# Patient Record
Sex: FEMALE | Race: ASIAN | NOT HISPANIC OR LATINO | URBAN - METROPOLITAN AREA
[De-identification: names, ages, dates, MRNs, and addresses within clinical notes are randomized per-mention and may not be internally consistent; named-entity substitution may affect disease eponyms.]

---

## 2017-01-18 ENCOUNTER — EMERGENCY (EMERGENCY)
Facility: HOSPITAL | Age: 47
LOS: 1 days | Discharge: HOME | End: 2017-01-18
Attending: EMERGENCY MEDICINE | Admitting: EMERGENCY MEDICINE
Payer: MEDICAID

## 2017-01-18 VITALS
TEMPERATURE: 98 F | HEART RATE: 91 BPM | DIASTOLIC BLOOD PRESSURE: 102 MMHG | RESPIRATION RATE: 20 BRPM | OXYGEN SATURATION: 100 % | SYSTOLIC BLOOD PRESSURE: 148 MMHG

## 2017-01-18 VITALS
SYSTOLIC BLOOD PRESSURE: 140 MMHG | DIASTOLIC BLOOD PRESSURE: 81 MMHG | TEMPERATURE: 98 F | RESPIRATION RATE: 20 BRPM | HEART RATE: 95 BPM | OXYGEN SATURATION: 100 %

## 2017-01-18 DIAGNOSIS — Z90.710 ACQUIRED ABSENCE OF BOTH CERVIX AND UTERUS: Chronic | ICD-10-CM

## 2017-01-18 LAB
ALBUMIN SERPL ELPH-MCNC: 4.3 G/DL — SIGNIFICANT CHANGE UP (ref 3.3–5)
ALP SERPL-CCNC: 125 U/L — HIGH (ref 40–120)
ALT FLD-CCNC: 26 U/L — SIGNIFICANT CHANGE UP (ref 4–33)
AST SERPL-CCNC: 24 U/L — SIGNIFICANT CHANGE UP (ref 4–32)
BASOPHILS # BLD AUTO: 0.08 K/UL — SIGNIFICANT CHANGE UP (ref 0–0.2)
BASOPHILS NFR BLD AUTO: 0.8 % — SIGNIFICANT CHANGE UP (ref 0–2)
BILIRUB SERPL-MCNC: 0.3 MG/DL — SIGNIFICANT CHANGE UP (ref 0.2–1.2)
BUN SERPL-MCNC: 7 MG/DL — SIGNIFICANT CHANGE UP (ref 7–23)
CALCIUM SERPL-MCNC: 9.5 MG/DL — SIGNIFICANT CHANGE UP (ref 8.4–10.5)
CHLORIDE SERPL-SCNC: 103 MMOL/L — SIGNIFICANT CHANGE UP (ref 98–107)
CO2 SERPL-SCNC: 24 MMOL/L — SIGNIFICANT CHANGE UP (ref 22–31)
CREAT SERPL-MCNC: 0.63 MG/DL — SIGNIFICANT CHANGE UP (ref 0.5–1.3)
EOSINOPHIL # BLD AUTO: 0.27 K/UL — SIGNIFICANT CHANGE UP (ref 0–0.5)
EOSINOPHIL NFR BLD AUTO: 2.7 % — SIGNIFICANT CHANGE UP (ref 0–6)
GLUCOSE SERPL-MCNC: 117 MG/DL — HIGH (ref 70–99)
HCT VFR BLD CALC: 41.2 % — SIGNIFICANT CHANGE UP (ref 34.5–45)
HGB BLD-MCNC: 13.9 G/DL — SIGNIFICANT CHANGE UP (ref 11.5–15.5)
IMM GRANULOCYTES NFR BLD AUTO: 0.2 % — SIGNIFICANT CHANGE UP (ref 0–1.5)
LYMPHOCYTES # BLD AUTO: 2.29 K/UL — SIGNIFICANT CHANGE UP (ref 1–3.3)
LYMPHOCYTES # BLD AUTO: 23.3 % — SIGNIFICANT CHANGE UP (ref 13–44)
MCHC RBC-ENTMCNC: 29.7 PG — SIGNIFICANT CHANGE UP (ref 27–34)
MCHC RBC-ENTMCNC: 33.7 % — SIGNIFICANT CHANGE UP (ref 32–36)
MCV RBC AUTO: 88 FL — SIGNIFICANT CHANGE UP (ref 80–100)
MONOCYTES # BLD AUTO: 0.54 K/UL — SIGNIFICANT CHANGE UP (ref 0–0.9)
MONOCYTES NFR BLD AUTO: 5.5 % — SIGNIFICANT CHANGE UP (ref 2–14)
NEUTROPHILS # BLD AUTO: 6.62 K/UL — SIGNIFICANT CHANGE UP (ref 1.8–7.4)
NEUTROPHILS NFR BLD AUTO: 67.5 % — SIGNIFICANT CHANGE UP (ref 43–77)
PLATELET # BLD AUTO: 280 K/UL — SIGNIFICANT CHANGE UP (ref 150–400)
PMV BLD: 11.4 FL — SIGNIFICANT CHANGE UP (ref 7–13)
POTASSIUM SERPL-MCNC: 4.2 MMOL/L — SIGNIFICANT CHANGE UP (ref 3.5–5.3)
POTASSIUM SERPL-SCNC: 4.2 MMOL/L — SIGNIFICANT CHANGE UP (ref 3.5–5.3)
PROT SERPL-MCNC: 7.7 G/DL — SIGNIFICANT CHANGE UP (ref 6–8.3)
RBC # BLD: 4.68 M/UL — SIGNIFICANT CHANGE UP (ref 3.8–5.2)
RBC # FLD: 13 % — SIGNIFICANT CHANGE UP (ref 10.3–14.5)
SODIUM SERPL-SCNC: 143 MMOL/L — SIGNIFICANT CHANGE UP (ref 135–145)
WBC # BLD: 9.82 K/UL — SIGNIFICANT CHANGE UP (ref 3.8–10.5)
WBC # FLD AUTO: 9.82 K/UL — SIGNIFICANT CHANGE UP (ref 3.8–10.5)

## 2017-01-18 PROCEDURE — 99284 EMERGENCY DEPT VISIT MOD MDM: CPT

## 2017-01-18 RX ORDER — SODIUM CHLORIDE 9 MG/ML
1000 INJECTION INTRAMUSCULAR; INTRAVENOUS; SUBCUTANEOUS ONCE
Qty: 0 | Refills: 0 | Status: COMPLETED | OUTPATIENT
Start: 2017-01-18 | End: 2017-01-18

## 2017-01-18 RX ORDER — METOCLOPRAMIDE HCL 10 MG
10 TABLET ORAL ONCE
Qty: 0 | Refills: 0 | Status: COMPLETED | OUTPATIENT
Start: 2017-01-18 | End: 2017-01-18

## 2017-01-18 RX ORDER — KETOROLAC TROMETHAMINE 30 MG/ML
15 SYRINGE (ML) INJECTION ONCE
Qty: 0 | Refills: 0 | Status: DISCONTINUED | OUTPATIENT
Start: 2017-01-18 | End: 2017-01-18

## 2017-01-18 RX ADMIN — Medication 15 MILLIGRAM(S): at 21:01

## 2017-01-18 RX ADMIN — Medication 10 MILLIGRAM(S): at 20:03

## 2017-01-18 RX ADMIN — SODIUM CHLORIDE 1000 MILLILITER(S): 9 INJECTION INTRAMUSCULAR; INTRAVENOUS; SUBCUTANEOUS at 20:03

## 2017-01-18 NOTE — ED ADULT NURSE NOTE - OBJECTIVE STATEMENT
pt to spot # 1 alert and oriented X 3 with family at bedside, p/w headache x 2 days. Headache is b/l, gradual in onset, similar to previous headaches, associated with nausea, light headed ness, nbnb vomiting when attempting to eat. Pt denies neck stiffness, numbness, weakness, vision changes, fevers, chills, back pain, abd pain, chest pain. Pt took fioricet earlier today without relief, labs drawn and sent , vitals as noted, meds given see MAR, will monitor and follow up

## 2017-01-18 NOTE — ED PROVIDER NOTE - MEDICAL DECISION MAKING DETAILS
headache likely migraine, sah unlikely, labs, ivf, pain control, no indication for imaging at this time, reassess, dc if improved

## 2017-01-18 NOTE — ED PROVIDER NOTE - OBJECTIVE STATEMENT
47 y/o F with PMH of migraines p/w headache x 2 days. Headache is b/l, gradual in onset, similar to previous headaches, associated with nausea, lightheadness, nbnb vomiting when attempting to eat. Pt denies neck stiffness, numbness, weakness, vision changes, fevers, chills, back pain, abd pain, chest pain. Pt took fioricet earlier today without relief.

## 2017-01-18 NOTE — ED PROVIDER NOTE - PROGRESS NOTE DETAILS
Pt improved. Vital signs stable. Labs reviewed. Stable neuro exam. Will dc with neuro/pmd f/u. Patient is stable, vital signs stable. Patient with capacity, able to verbalize understanding of discharge instructions, return instructions, and need for close follow up.  Jose Ramon Caro MD PGY-3

## 2017-01-18 NOTE — ED ADULT TRIAGE NOTE - CHIEF COMPLAINT QUOTE
pt with c/o migraine headache x 2 days nausea not relieved with butalbital given by pmd. Pt also took Excedrin.

## 2017-01-18 NOTE — ED PROVIDER NOTE - ATTENDING CONTRIBUTION TO CARE
Pt was seen and evaluated by me. Pt states having a history of Migraines and presents today for typical migraine X 2 days, unrelieved with her home medication. Pt admits to nausea and vomiting, last episode at 1700. Pt denies any vision changes, neck pain, back pain, numbness, weakness, fever, chills, SOB, chest pain, or abd pain. No focal deficits on exam. Lungs CTA b/l. RRR. Abd soft, non-tender.

## 2017-11-01 NOTE — ED PROVIDER NOTE - CPE EDP PSYCH NORM
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 MOM LOOKED VERY RELIEVED. PT WAIVED
AND SMILED A SHE PASSED BY. normal...

## 2017-12-22 PROBLEM — G43.909 MIGRAINE, UNSPECIFIED, NOT INTRACTABLE, WITHOUT STATUS MIGRAINOSUS: Chronic | Status: ACTIVE | Noted: 2017-01-18

## 2017-12-22 PROBLEM — Z00.00 ENCOUNTER FOR PREVENTIVE HEALTH EXAMINATION: Status: ACTIVE | Noted: 2017-12-22

## 2018-01-12 PROBLEM — M25.521 ELBOW PAIN, RIGHT: Status: ACTIVE | Noted: 2018-01-12

## 2018-01-17 ENCOUNTER — OUTPATIENT (OUTPATIENT)
Dept: OUTPATIENT SERVICES | Facility: HOSPITAL | Age: 48
LOS: 1 days | End: 2018-01-17
Payer: MEDICAID

## 2018-01-17 ENCOUNTER — APPOINTMENT (OUTPATIENT)
Dept: ORTHOPEDIC SURGERY | Facility: HOSPITAL | Age: 48
End: 2018-01-17

## 2018-01-17 VITALS
DIASTOLIC BLOOD PRESSURE: 104 MMHG | WEIGHT: 192 LBS | HEIGHT: 69 IN | HEART RATE: 100 BPM | BODY MASS INDEX: 28.44 KG/M2 | SYSTOLIC BLOOD PRESSURE: 136 MMHG

## 2018-01-17 DIAGNOSIS — M25.521 PAIN IN RIGHT ELBOW: ICD-10-CM

## 2018-01-17 DIAGNOSIS — Z90.710 ACQUIRED ABSENCE OF BOTH CERVIX AND UTERUS: Chronic | ICD-10-CM

## 2018-01-17 PROCEDURE — 73070 X-RAY EXAM OF ELBOW: CPT | Mod: 26,RT

## 2018-01-19 DIAGNOSIS — M77.11 LATERAL EPICONDYLITIS, RIGHT ELBOW: ICD-10-CM

## 2018-02-28 ENCOUNTER — APPOINTMENT (OUTPATIENT)
Dept: ORTHOPEDIC SURGERY | Facility: HOSPITAL | Age: 48
End: 2018-02-28

## 2018-06-01 ENCOUNTER — EMERGENCY (EMERGENCY)
Facility: HOSPITAL | Age: 48
LOS: 1 days | Discharge: HOME | End: 2018-06-01
Attending: EMERGENCY MEDICINE | Admitting: EMERGENCY MEDICINE
Payer: MEDICAID

## 2018-06-01 VITALS
SYSTOLIC BLOOD PRESSURE: 157 MMHG | TEMPERATURE: 98 F | DIASTOLIC BLOOD PRESSURE: 106 MMHG | HEART RATE: 90 BPM | RESPIRATION RATE: 16 BRPM | OXYGEN SATURATION: 98 %

## 2018-06-01 DIAGNOSIS — Z90.710 ACQUIRED ABSENCE OF BOTH CERVIX AND UTERUS: Chronic | ICD-10-CM

## 2018-06-01 PROCEDURE — 99283 EMERGENCY DEPT VISIT LOW MDM: CPT

## 2018-06-01 RX ORDER — TRAMADOL HYDROCHLORIDE 50 MG/1
50 TABLET ORAL ONCE
Qty: 0 | Refills: 0 | Status: DISCONTINUED | OUTPATIENT
Start: 2018-06-01 | End: 2018-06-01

## 2018-06-01 RX ORDER — TRAMADOL HYDROCHLORIDE 50 MG/1
1 TABLET ORAL
Qty: 12 | Refills: 0
Start: 2018-06-01 | End: 2018-06-03

## 2018-06-01 RX ADMIN — TRAMADOL HYDROCHLORIDE 50 MILLIGRAM(S): 50 TABLET ORAL at 18:21

## 2018-06-01 NOTE — ED PROVIDER NOTE - CHPI ED SYMPTOMS NEG
no chest pain, no SOB, no abdominal pain, no urinary symptoms, no blood in stool or urine, no blood clots

## 2018-06-01 NOTE — ED ADULT TRIAGE NOTE - CHIEF COMPLAINT QUOTE
pt amb to triage c/o R knee pain, denies trauma, or fall, increased pain 2 days ago, + pedal pulse, presents w/ knee brace, motrin @ home w/ no relief

## 2018-06-01 NOTE — ED PROVIDER NOTE - LOWER EXTREMITY EXAM, RIGHT
mild tenderness to right knee, no groin pain, no calf swelling, no obvious swelling, no varus or valgus laxity, no anterior or posterior drawer sign mild tenderness to right knee, no groin tenderness, no calf swelling, no obvious swelling, no varus or valgus laxity, no anterior or posterior drawer sign

## 2018-06-01 NOTE — ED PROVIDER NOTE - PROGRESS NOTE DETAILS
HERNÁN Parikh: pt doing well, reports pain relieved. will dc with short course pain meds and pmd fu.

## 2018-06-01 NOTE — ED PROVIDER NOTE - MEDICAL DECISION MAKING DETAILS
46 y/o F with right knee pain worse over past 2 days, no systemic sx, denies any trauma, no obvious swelling or erythema to right knee, no risk factors for DVT. Will try pain control, pt just had imaging and will follow up on Thursday with orthopedics. 48 y/o F with right knee pain worse over past 2 days, no systemic sx, denies any trauma, no obvious swelling or erythema to right knee, no risk factors for DVT. Pain is to anterior knee at both medial and lateral aspects of patella. No calf swelling. Will try pain control, pt just had imaging and will follow up on Thursday with orthopedics.

## 2018-06-01 NOTE — ED PROVIDER NOTE - OBJECTIVE STATEMENT
48 y/o F with no significant PMHx presents to the ED c/o right knee pain x 1 week, with severe pain for the past 2 days. Pt has difficulty ambulating. Pt reports seeing her PMD 2 days ago and received a shot in arm that did not improve her pain. Pt visited her usual physiotherapist for 3 sessions for tennis elbow. Pt endorses use of Motrin 400 mg BID at home  without relief, and has tried naproxen starting yesterday. Pt last took Motrin 400 mg this morning. Pt has appointment scheduled with orthopedic doctor next Thursday with referral from her physiotherapist. Pt reports completing X-ray 2 days ago by her PMD. Denies chest pain, SOB, abdominal pain, urinary symptoms, blood in stool or urine, recent travel, or history of blood clots.

## 2018-06-01 NOTE — ED PROVIDER NOTE - NS_ ATTENDINGSCRIBEDETAILS _ED_A_ED_FT
I,Demi Boyd, performed the initial face to face bedside interview with this patient regarding history of present illness, review of symptoms and relevant past medical, social and family history.  I completed an independent physical examination.  I was the initial provider who evaluated this patient. The history, relevant review of systems, past medical and surgical history, medical decision making, and physical examination was documented by the scribe in my presence and I attest to the accuracy of the documentation.

## 2018-06-07 ENCOUNTER — APPOINTMENT (OUTPATIENT)
Dept: ORTHOPEDIC SURGERY | Facility: CLINIC | Age: 48
End: 2018-06-07
Payer: COMMERCIAL

## 2018-06-07 VITALS
DIASTOLIC BLOOD PRESSURE: 93 MMHG | BODY MASS INDEX: 27.85 KG/M2 | SYSTOLIC BLOOD PRESSURE: 151 MMHG | WEIGHT: 188 LBS | HEIGHT: 69 IN

## 2018-06-07 DIAGNOSIS — M25.569 PAIN IN UNSPECIFIED KNEE: ICD-10-CM

## 2018-06-07 PROCEDURE — 72170 X-RAY EXAM OF PELVIS: CPT

## 2018-06-07 PROCEDURE — 99215 OFFICE O/P EST HI 40 MIN: CPT

## 2018-06-07 PROCEDURE — 73562 X-RAY EXAM OF KNEE 3: CPT | Mod: RT

## 2018-06-29 ENCOUNTER — APPOINTMENT (OUTPATIENT)
Dept: ORTHOPEDIC SURGERY | Facility: CLINIC | Age: 48
End: 2018-06-29
Payer: MEDICAID

## 2018-06-29 VITALS
HEART RATE: 116 BPM | DIASTOLIC BLOOD PRESSURE: 72 MMHG | BODY MASS INDEX: 27.85 KG/M2 | HEIGHT: 69 IN | WEIGHT: 188 LBS | SYSTOLIC BLOOD PRESSURE: 126 MMHG

## 2018-06-29 DIAGNOSIS — M23.303 OTHER MENISCUS DERANGEMENTS, UNSPECIFIED MEDIAL MENISCUS, RIGHT KNEE: ICD-10-CM

## 2018-06-29 PROCEDURE — 72170 X-RAY EXAM OF PELVIS: CPT

## 2018-06-29 PROCEDURE — 99214 OFFICE O/P EST MOD 30 MIN: CPT | Mod: 25

## 2018-06-29 PROCEDURE — 73562 X-RAY EXAM OF KNEE 3: CPT | Mod: RT

## 2018-06-29 PROCEDURE — 20610 DRAIN/INJ JOINT/BURSA W/O US: CPT | Mod: RT

## 2018-07-12 ENCOUNTER — APPOINTMENT (OUTPATIENT)
Dept: ORTHOPEDIC SURGERY | Facility: CLINIC | Age: 48
End: 2018-07-12
Payer: MEDICAID

## 2018-07-12 ENCOUNTER — TRANSCRIPTION ENCOUNTER (OUTPATIENT)
Age: 48
End: 2018-07-12

## 2018-07-12 VITALS
BODY MASS INDEX: 27.76 KG/M2 | DIASTOLIC BLOOD PRESSURE: 93 MMHG | HEART RATE: 83 BPM | SYSTOLIC BLOOD PRESSURE: 149 MMHG | WEIGHT: 188 LBS

## 2018-07-12 PROBLEM — M23.303 DERANGEMENT OF MEDIAL MENISCUS OF RIGHT KNEE: Status: ACTIVE | Noted: 2018-06-07

## 2018-07-12 PROCEDURE — 99214 OFFICE O/P EST MOD 30 MIN: CPT

## 2018-07-13 LAB
BASOPHILS # BLD AUTO: 0.09 K/UL
BASOPHILS NFR BLD AUTO: 1 %
EOSINOPHIL # BLD AUTO: 0.29 K/UL
EOSINOPHIL NFR BLD AUTO: 3.2 %
HCT VFR BLD CALC: 41 %
HGB BLD-MCNC: 13.4 G/DL
IMM GRANULOCYTES NFR BLD AUTO: 0.1 %
LYMPHOCYTES # BLD AUTO: 2.12 K/UL
LYMPHOCYTES NFR BLD AUTO: 23.3 %
MAN DIFF?: NORMAL
MCHC RBC-ENTMCNC: 29.6 PG
MCHC RBC-ENTMCNC: 32.7 GM/DL
MCV RBC AUTO: 90.7 FL
MONOCYTES # BLD AUTO: 0.58 K/UL
MONOCYTES NFR BLD AUTO: 6.4 %
NEUTROPHILS # BLD AUTO: 6.02 K/UL
NEUTROPHILS NFR BLD AUTO: 66 %
PLATELET # BLD AUTO: 314 K/UL
RBC # BLD: 4.52 M/UL
RBC # FLD: 14.1 %
WBC # FLD AUTO: 9.11 K/UL

## 2018-07-17 LAB
ANA SER IF-ACNC: NEGATIVE
CRP SERPL-MCNC: 0.64 MG/DL
DSDNA AB SER-ACNC: <12 IU/ML
ERYTHROCYTE [SEDIMENTATION RATE] IN BLOOD BY WESTERGREN METHOD: 33 MM/HR
RHEUMATOID FACT SER QL: 10 IU/ML
URATE SERPL-MCNC: 4 MG/DL

## 2018-07-26 ENCOUNTER — APPOINTMENT (OUTPATIENT)
Dept: ORTHOPEDIC SURGERY | Facility: CLINIC | Age: 48
End: 2018-07-26
Payer: MEDICAID

## 2018-07-26 VITALS
DIASTOLIC BLOOD PRESSURE: 90 MMHG | WEIGHT: 188 LBS | BODY MASS INDEX: 27.76 KG/M2 | HEART RATE: 91 BPM | SYSTOLIC BLOOD PRESSURE: 133 MMHG

## 2018-07-26 DIAGNOSIS — M17.11 UNILATERAL PRIMARY OSTEOARTHRITIS, RIGHT KNEE: ICD-10-CM

## 2018-07-26 PROCEDURE — 99214 OFFICE O/P EST MOD 30 MIN: CPT

## 2018-08-03 PROBLEM — M17.11 PRIMARY OSTEOARTHRITIS OF RIGHT KNEE: Status: ACTIVE | Noted: 2018-06-07

## 2019-05-06 ENCOUNTER — EMERGENCY (EMERGENCY)
Facility: HOSPITAL | Age: 49
LOS: 1 days | Discharge: LEFT BEFORE DISCHARGE | End: 2019-05-06
Attending: EMERGENCY MEDICINE | Admitting: EMERGENCY MEDICINE
Payer: MEDICAID

## 2019-05-06 VITALS
TEMPERATURE: 98 F | RESPIRATION RATE: 16 BRPM | SYSTOLIC BLOOD PRESSURE: 143 MMHG | HEART RATE: 67 BPM | OXYGEN SATURATION: 100 % | DIASTOLIC BLOOD PRESSURE: 90 MMHG

## 2019-05-06 DIAGNOSIS — Z90.710 ACQUIRED ABSENCE OF BOTH CERVIX AND UTERUS: Chronic | ICD-10-CM

## 2019-05-06 LAB
ALBUMIN SERPL ELPH-MCNC: 4.2 G/DL — SIGNIFICANT CHANGE UP (ref 3.3–5)
ALP SERPL-CCNC: 96 U/L — SIGNIFICANT CHANGE UP (ref 40–120)
ALT FLD-CCNC: 14 U/L — SIGNIFICANT CHANGE UP (ref 4–33)
ANION GAP SERPL CALC-SCNC: 12 MMO/L — SIGNIFICANT CHANGE UP (ref 7–14)
APPEARANCE UR: CLEAR — SIGNIFICANT CHANGE UP
AST SERPL-CCNC: 18 U/L — SIGNIFICANT CHANGE UP (ref 4–32)
BASOPHILS # BLD AUTO: 0.1 K/UL — SIGNIFICANT CHANGE UP (ref 0–0.2)
BASOPHILS NFR BLD AUTO: 0.9 % — SIGNIFICANT CHANGE UP (ref 0–2)
BILIRUB SERPL-MCNC: < 0.2 MG/DL — LOW (ref 0.2–1.2)
BILIRUB UR-MCNC: NEGATIVE — SIGNIFICANT CHANGE UP
BLOOD UR QL VISUAL: NEGATIVE — SIGNIFICANT CHANGE UP
BUN SERPL-MCNC: 10 MG/DL — SIGNIFICANT CHANGE UP (ref 7–23)
CALCIUM SERPL-MCNC: 10 MG/DL — SIGNIFICANT CHANGE UP (ref 8.4–10.5)
CHLORIDE SERPL-SCNC: 104 MMOL/L — SIGNIFICANT CHANGE UP (ref 98–107)
CO2 SERPL-SCNC: 21 MMOL/L — LOW (ref 22–31)
COLOR SPEC: SIGNIFICANT CHANGE UP
CREAT SERPL-MCNC: 0.68 MG/DL — SIGNIFICANT CHANGE UP (ref 0.5–1.3)
EOSINOPHIL # BLD AUTO: 0.17 K/UL — SIGNIFICANT CHANGE UP (ref 0–0.5)
EOSINOPHIL NFR BLD AUTO: 1.6 % — SIGNIFICANT CHANGE UP (ref 0–6)
GLUCOSE SERPL-MCNC: 112 MG/DL — HIGH (ref 70–99)
GLUCOSE UR-MCNC: NEGATIVE — SIGNIFICANT CHANGE UP
HCT VFR BLD CALC: 40.5 % — SIGNIFICANT CHANGE UP (ref 34.5–45)
HGB BLD-MCNC: 13.6 G/DL — SIGNIFICANT CHANGE UP (ref 11.5–15.5)
IMM GRANULOCYTES NFR BLD AUTO: 0.2 % — SIGNIFICANT CHANGE UP (ref 0–1.5)
KETONES UR-MCNC: NEGATIVE — SIGNIFICANT CHANGE UP
LEUKOCYTE ESTERASE UR-ACNC: NEGATIVE — SIGNIFICANT CHANGE UP
LIDOCAIN IGE QN: 42.5 U/L — SIGNIFICANT CHANGE UP (ref 7–60)
LYMPHOCYTES # BLD AUTO: 2.8 K/UL — SIGNIFICANT CHANGE UP (ref 1–3.3)
LYMPHOCYTES # BLD AUTO: 26.5 % — SIGNIFICANT CHANGE UP (ref 13–44)
MCHC RBC-ENTMCNC: 29.6 PG — SIGNIFICANT CHANGE UP (ref 27–34)
MCHC RBC-ENTMCNC: 33.6 % — SIGNIFICANT CHANGE UP (ref 32–36)
MCV RBC AUTO: 88.2 FL — SIGNIFICANT CHANGE UP (ref 80–100)
MONOCYTES # BLD AUTO: 0.87 K/UL — SIGNIFICANT CHANGE UP (ref 0–0.9)
MONOCYTES NFR BLD AUTO: 8.2 % — SIGNIFICANT CHANGE UP (ref 2–14)
NEUTROPHILS # BLD AUTO: 6.62 K/UL — SIGNIFICANT CHANGE UP (ref 1.8–7.4)
NEUTROPHILS NFR BLD AUTO: 62.6 % — SIGNIFICANT CHANGE UP (ref 43–77)
NITRITE UR-MCNC: NEGATIVE — SIGNIFICANT CHANGE UP
NRBC # FLD: 0 K/UL — SIGNIFICANT CHANGE UP (ref 0–0)
PH UR: 7.5 — SIGNIFICANT CHANGE UP (ref 5–8)
PLATELET # BLD AUTO: 276 K/UL — SIGNIFICANT CHANGE UP (ref 150–400)
PMV BLD: 11.8 FL — SIGNIFICANT CHANGE UP (ref 7–13)
POTASSIUM SERPL-MCNC: 5.3 MMOL/L — SIGNIFICANT CHANGE UP (ref 3.5–5.3)
POTASSIUM SERPL-SCNC: 5.3 MMOL/L — SIGNIFICANT CHANGE UP (ref 3.5–5.3)
PROT SERPL-MCNC: 7.2 G/DL — SIGNIFICANT CHANGE UP (ref 6–8.3)
PROT UR-MCNC: NEGATIVE — SIGNIFICANT CHANGE UP
RBC # BLD: 4.59 M/UL — SIGNIFICANT CHANGE UP (ref 3.8–5.2)
RBC # FLD: 13 % — SIGNIFICANT CHANGE UP (ref 10.3–14.5)
SODIUM SERPL-SCNC: 137 MMOL/L — SIGNIFICANT CHANGE UP (ref 135–145)
SP GR SPEC: 1.02 — SIGNIFICANT CHANGE UP (ref 1–1.04)
UROBILINOGEN FLD QL: NORMAL — SIGNIFICANT CHANGE UP
WBC # BLD: 10.58 K/UL — HIGH (ref 3.8–10.5)
WBC # FLD AUTO: 10.58 K/UL — HIGH (ref 3.8–10.5)

## 2019-05-06 PROCEDURE — 74176 CT ABD & PELVIS W/O CONTRAST: CPT | Mod: 26

## 2019-05-06 PROCEDURE — 99284 EMERGENCY DEPT VISIT MOD MDM: CPT

## 2019-05-06 RX ORDER — SODIUM CHLORIDE 9 MG/ML
1000 INJECTION, SOLUTION INTRAVENOUS ONCE
Qty: 0 | Refills: 0 | Status: COMPLETED | OUTPATIENT
Start: 2019-05-06 | End: 2019-05-06

## 2019-05-06 RX ORDER — MORPHINE SULFATE 50 MG/1
4 CAPSULE, EXTENDED RELEASE ORAL ONCE
Qty: 0 | Refills: 0 | Status: DISCONTINUED | OUTPATIENT
Start: 2019-05-06 | End: 2019-05-06

## 2019-05-06 RX ORDER — ONDANSETRON 8 MG/1
4 TABLET, FILM COATED ORAL ONCE
Qty: 0 | Refills: 0 | Status: COMPLETED | OUTPATIENT
Start: 2019-05-06 | End: 2019-05-06

## 2019-05-06 RX ADMIN — MORPHINE SULFATE 4 MILLIGRAM(S): 50 CAPSULE, EXTENDED RELEASE ORAL at 20:15

## 2019-05-06 RX ADMIN — SODIUM CHLORIDE 1000 MILLILITER(S): 9 INJECTION, SOLUTION INTRAVENOUS at 20:15

## 2019-05-06 RX ADMIN — ONDANSETRON 4 MILLIGRAM(S): 8 TABLET, FILM COATED ORAL at 20:15

## 2019-05-06 NOTE — ED PROVIDER NOTE - ATTENDING CONTRIBUTION TO CARE
48F p/w R flank pain x 4-5 hrs a/w nausea.  no vomiting. nonradiating, sharp.  Pt went to , UA and xray done, "was ok".  Never had this before.  Did not take medicine prior to coming here.  No fever.  No dysuria or hematuria.  PMHX - HTN meds - atenolol 25mg qd.  PSHX - JAZIEL  No T.  All - NKA.  Likely 1st time stone, will check labs, urine, CT, rx pain meds and reassess.   VS:  unremarkable    GEN - mild-mod distress R flank pain; A+O x3   HEAD - NC/AT     ENT - PEERL, EOMI, mucous membranes dry, no discharge      NECK: Neck supple, non-tender without lymphadenopathy, no masses, no JVD  PULM - CTA b/l,  symmetric breath sounds  COR -  normal heart sounds    ABD - , ND, NT, soft,  BACK - (+)R CVA tenderness, nontender spine     EXTREMS - no edema, no deformity, warm and well perfused    SKIN - no rash or bruising      NEUROLOGIC - alert, CN 2-12 intact, sensation nl, motor no focal deficit.

## 2019-05-06 NOTE — ED ADULT TRIAGE NOTE - CHIEF COMPLAINT QUOTE
Pt complaining of R sided flank pain x 4-5 hours with nausea. Pt denies chest pain, SOB, vomiting, fever or chills.

## 2019-05-06 NOTE — ED PROVIDER NOTE - PROGRESS NOTE DETAILS
My: Signed out 47 y/o F w/ hx as noted pw R sided flank/abdominal pain, associated w/ nausea.  Concern for renal stone, pending CT results.  prelim CT w/o acute findings, advised on adrenal adenoma and need for f/u.  Pain had resolved by my eval.  abdomen non-tender.  While awaiting official report pt eloped.

## 2020-12-28 NOTE — ED ADULT NURSE NOTE - FALLEN IN THE PAST
December 28, 2020     Tatum Arroyo  [address] [place] [address]      Dear Ms. Arroyo,    Please review the enclosed prep instructions for your procedure with Dionisio Veras MD.    Procedure(s):  Colonoscopy   Date of Procedure: December 30, 2020  Time of Procedure: 8:00 AM  Arrival Time: 7:15 AM  Location:  [address], [address]  [place], [place] [id number] [phone]  Please enter through the Physician's Office [place]    : Open Access Scheduling Patient Line [phone]    Please read these instructions very carefully at least 7 days prior to your procedure.     If there are questions about these instructions, please call the office at [phone]. Prior to the procedure, the procedure center will be calling to go over your health history and medications to be taken on the day of the procedure.     PATIENT CHECKLIST     · Blood Thinner(s) - Please contact the prescribing provider to get clearance to hold the following blood thinner(s):        · Aspirin does not need to be held, please continue taking it.  · Diabetics - Contact your primary physician or my office for instructions on your diabetic medications, including insulin. Check your blood sugars frequently the day you are on a clear liquid diet. In general, oral diabetic medications and regular insulin (NovoLog) are held on the morning of the procedure.    Additional Information:  · Please be on time, as arriving late could result in your procedure being delayed, canceled, or rescheduled.  · If you are running late please call the GI/Pulmonday Department at [phone].    Colonoscopy Instructions - Suprep    7 Days prior to your procedure  · If possible, try to avoid non-steroidal anti-inflammatory drugs (Ibuprofen, Advil, Motrin, Aleve, Naproxen etc.).   · Stop taking oral iron supplements.  Multi-vitamins with iron - OK to continue.  · Confirm you have an escort to take you  home following the procedure. You cannot take a taxi cab, bus, or public transport home by yourself.   · Do not eat popcorn, seeds, nuts or whole kernel corn.     5 Days prior to your procedure  · Do not eat products with [place] (a fat substitute found in [employer] and [place] Fat-Free chips).    1 Day before your procedure  · No solid food.  No alcohol.  · Clear liquids ALL DAY. If you can see through it, you can drink it. Examples are clear broth or consommé, fruit juices without pulp (no orange or tomato), sports drinks (Gatorade, Propel etc.), Jell-O (no fruit added), coffee or tea (sweeteners are ok, no milk or cream), soda or non-alcoholic carbonated drinks, popsicles, water, and clear hard candies. Avoid red and purple colors.   · It is important to try and stay hydrated during the day by drinking fluids. A solution such as Gatorade, or a similar sports drink, will help you to stay hydrated.  · At 5:00 PM, pour one (1) 6-ounce bottle of Suprep liquid into the mixing container. Add cool drinking water to the 16-ounce line on the container and mix. Drink all the liquid in the container. Then drink two (2) more 16-ounce containers of water over the next 1 hour.   · You can still continue to be on the clear liquid diet while prepping.      Day of the procedure  · No solid food. No alcohol.  · 6 hours prior to leaving the house, pour one (1) 6-ounce bottle of Suprep liquid into the mixing container. Add cool drinking water to the 16-ounce line on the container and mix. Drink all the liquid in the container. Then drink two (2) more 16-ounce containers of water over the next 1 hour.  · You can take your medications as instructed during phone call with procedure center staff, with a sip of water up to 4 hours prior to your scheduled procedure.   · You should not drink or take anything by mouth 4 hours prior to your procedure.  · Your stools should have a clear to see-through cloudy yellow appearance  with no formed substance. If your stools are darker or have formed substance, please call the office and speak with a nurse who will get further instructions from your physician.    Prepping times and instructions can be altered depending on time of procedure.  Please call office and ask to speak to a nurse to discuss.    Special Instructions    Kidney disease - If you have any problems with kidney disease. You should not take this prep. Please call my office, and we will prescribe a safer alternative    Frequently Asked Questions    What is a colonoscopy?   A colonoscopy is a procedure where we can examine your large intestine for abnormalities. We use an endoscope which is a flexible tube as thick as your finger, that has a camera and light at the tip     How long is the colon?   The average length of the colon is 4 to 5 feet.     Why do I need to take the preparation and clear liquid diet?  The most important part in a successful exam is the preparation. It is important to clean your colon completely prior to the exam. If there is still remaining stool in the colon, it can prolong your procedure, and we may not be able to visualize the entire colon and lesions could be missed. If you have a substantial amount of stool remaining, the procedure may be terminated, and a repeat or alternative prep may be required.    We have you take the Suprep the evening prior and morning of the procedure since studies have shown that taking the prep in 2 separate doses help improve the cleansing of the colon.     Is there an alternative to the Suprep?  In the past, most preps were performed with Fleets Phospho-Soda. However recent studies have shown the risk of causing permanent kidney damage/failure. Due to the risk of permanent kidney damage with the Fleet Phospho-Soda, most gastroenterologists have stopped using it.   There are alternatives to Suprep such as Nulytely, Prepopik and Moviprep.  If you would like the alternative,  then please contact my office.      If people have problems taking the prep at home, there is an alternative where you can take the entire preparation in the hospital the day of the procedure.     Can I take all my medications?  Most medications can be continued without problems.  If you have questions, please call the office. Blood thinners and anti-diabetic medications may need modification prior as detailed above.      What can I expect the day of the procedure?  You will arrive at the facility where you are scheduled. We have you arrive early since you will need to fill out your information. An IV will be placed so we can give you medications. I will talk to you and answer any questions you may have before the procedure. Once inside the procedure room, sedation will be given to help you relax.    You will usually lie on your left side. I will then advance the scope to the end of your large intestine. During the procedure it is normal to feel some pressure, bloating or cramping. Careful examination will be performed throughout your colon. The entire procedure takes approximately 30 minutes, however this can be prolonged in complicated cases.    Once the procedure is complete, you will be brought to the recovery room until you are stable to leave. You should plan on being at the procedure site for 2 to 3 hours.     Will I be completely sedated for the procedure?  Choice of sedation - Moderate Intravenous OR Monitored Anesthesia Care, is based upon past medical history and current medication use. The doctor performing the procedure will make this decision. The goal is to keep you comfortable during the procedure.    If you have questions regarding sedation, please call the office to discuss further.  In most cases patients receive conscious sedation, meaning that they will be in a “twilight sleep”. They will breathe on their own and will be able to follow commands. Since most of the discomfort is with inserting the  scope, this is when the heaviest sedation is used. It is not unusual for people to be awake for part of the exam. The goal of the sedation is to keep you as comfortable as possible.     Patients that have a history of taking chronic medications such as pain medication, anti-anxiety medications, or alcohol use may build up a tolerance to the sedation. This may make it difficult to fully sedate you for the procedure.    What if something abnormal is found during the colonoscopy?  Most polyps can be removed at the time of the colonoscopy. Biopsies, tissue samples, can be obtained during the exam.     What are polyps?  Polyps are abnormal growth of colon tissue. A majority are benign or non-cancerous. All polyps that are removed are sent to the lab for analysis. Some polyps are precancerous, which is why it is important to remove them while they are small and non-cancerous. Polyps can range from a couple of millimeters to a couple of cm.     How are the polyps removed?  The polyps can be removed by biopsy instruments. Some polyps are removed with a wire snare and cautery. Cautery produces an electrical current to help burn and remove the polyps. You should not feel any pain with removal of the polyps. The polyps are then retrieved and sent to a lab to be analyzed.     What will happen after the procedure?  I will discuss the findings with you prior to discharge. You will receive a phone call or letter from the office within 10-14 business days with the results and recommendations. Your family history, your personal history, and the number/size and type of polyps found on most recent exam will determine when you may need a repeat colonoscopy.     Even if you feel alert after the procedure, your judgment and reflexes may be impaired the rest of the day. You should not drive, work heavy machinery, or perform any other task that requires your full attention.    You may have bloating and cramping after the exam. This usually  is improved with passing of gas.    Normally you may resume a regular diet after the procedure is completed. If you are on a blood thinner, this may be held if a large polyp is removed. You will be provided with clear instructions regarding when to resume your blood thinner medications.    Why do I need someone to accompany me to the exam?  Because you are given a sedative, you need someone you know to drive you home after the exam. If you are taking a bus, taxi or van service a responsible adult you know must accompany you. If this is a problem, the colonoscopy can be attempted without any sedation, or inpatient observation may be recommended.     What are the complications of the exam?  Colonoscopies are relatively safe, however complications can occur. Some, but not all, of the risk are discussed below.  Some patients may have an adverse reaction with the sedation or complications from heart and lung disease. There is also the risk of causing bleeding and perforation (poking a hole in the colon). If these complications do occur, they may need surgical treatment rarely. There is also a risk of missed lesions.     After the procedure, if you have any abdominal pain, fever, chills, or rectal bleeding it is important to notify me or seek immediate medical attention.  It is important to know that bleeding can occur even several days after the procedure.    no

## 2021-02-15 NOTE — ED ADULT TRIAGE NOTE - BP NONINVASIVE SYSTOLIC (MM HG)
148 Albendazole Counseling:  I discussed with the patient the risks of albendazole including but not limited to cytopenia, kidney damage, nausea/vomiting and severe allergy.  The patient understands that this medication is being used in an off-label manner.

## 2021-04-22 ENCOUNTER — EMERGENCY (EMERGENCY)
Facility: HOSPITAL | Age: 51
LOS: 1 days | Discharge: HOME | End: 2021-04-22
Attending: EMERGENCY MEDICINE | Admitting: EMERGENCY MEDICINE
Payer: MEDICAID

## 2021-04-22 VITALS
TEMPERATURE: 98 F | RESPIRATION RATE: 16 BRPM | OXYGEN SATURATION: 100 % | SYSTOLIC BLOOD PRESSURE: 128 MMHG | DIASTOLIC BLOOD PRESSURE: 70 MMHG | HEART RATE: 81 BPM

## 2021-04-22 VITALS
HEART RATE: 88 BPM | RESPIRATION RATE: 18 BRPM | DIASTOLIC BLOOD PRESSURE: 68 MMHG | SYSTOLIC BLOOD PRESSURE: 118 MMHG | TEMPERATURE: 98 F | OXYGEN SATURATION: 100 %

## 2021-04-22 DIAGNOSIS — Z90.710 ACQUIRED ABSENCE OF BOTH CERVIX AND UTERUS: Chronic | ICD-10-CM

## 2021-04-22 PROCEDURE — 76882 US LMTD JT/FCL EVL NVASC XTR: CPT | Mod: 26,LT

## 2021-04-22 PROCEDURE — 99284 EMERGENCY DEPT VISIT MOD MDM: CPT

## 2021-04-22 RX ORDER — TRAMADOL HYDROCHLORIDE 50 MG/1
1 TABLET ORAL
Qty: 8 | Refills: 0
Start: 2021-04-22 | End: 2021-04-23

## 2021-04-22 RX ORDER — ACETAMINOPHEN 500 MG
975 TABLET ORAL ONCE
Refills: 0 | Status: COMPLETED | OUTPATIENT
Start: 2021-04-22 | End: 2021-04-22

## 2021-04-22 RX ORDER — TRAMADOL HYDROCHLORIDE 50 MG/1
50 TABLET ORAL ONCE
Refills: 0 | Status: DISCONTINUED | OUTPATIENT
Start: 2021-04-22 | End: 2021-04-22

## 2021-04-22 RX ADMIN — Medication 975 MILLIGRAM(S): at 04:04

## 2021-04-22 RX ADMIN — TRAMADOL HYDROCHLORIDE 50 MILLIGRAM(S): 50 TABLET ORAL at 04:04

## 2021-04-22 NOTE — ED PROVIDER NOTE - NSFOLLOWUPINSTRUCTIONS_ED_ALL_ED_FT
You were seen in the Emergency Department (ED) for knee pain.     You are prescribed Tramadol. Please take as directed by your pharmacists.   Continue to take your home medications as prescribed.     Please follow up with your primary care doctor in the next 72 hours.     Please return to the ED if you experience any new or concerning symptoms, such as:   - worsening knee pain  - unable to move or feel part of your body  - fever, chills    Thank you for visiting a [facility] ED.

## 2021-04-22 NOTE — ED PROVIDER NOTE - PROGRESS NOTE DETAILS
Gracia Villasenor M.D. Resident  negative for baker cyst, pt states pain mildly better. Agreeable to going home. Ace wrap applied. Pt demonstrated how to correctly use crutches and expressed understanding that tramadol Rx is for breakthrough pain.

## 2021-04-22 NOTE — ED PROVIDER NOTE - ATTENDING CONTRIBUTION TO CARE
History and physical above obtained and documented (or dictated) by me (attending physician).  Resident involved in case for assistance with disposition and may document involvement as necessary via progress note(s).   -Dr. Ford

## 2021-04-22 NOTE — ED PROVIDER NOTE - OBJECTIVE STATEMENT
Pertinent PMH/PSH/FHx/SHx and Review of Systems contained within:  49yo F pmh of "abnormal heart rhythm" on metoprolol and xarelto, currently being worked up outpt by PMD for Rheumatoid arthritis, presents c/o acute worsening of subacute Left knee pain. Pt reports that she saw PMD for migratory arthralgias which started in Right knee (has since resolved) but then began experiencing Left knee pain intermittently for the last month, gradually worsening. Pt had outpt blood work with elevated inflammatory markers and was put on a 5 day course of prednisone (completed few days ago) but as symptoms continued to worsen, PMD gave her first dose of subcutaneous methotrexate injection ystdy. Pt states last dose of tylenol was over 24hrs ago with minimal improvement and has not taken any NSAIDs because she is on xarelto.    No fever/chills, No photophobia/eye pain/changes in vision, No ear pain/sore throat/dysphagia, No chest pain/palpitations, no SOB/cough/wheeze/stridor, No abdominal pain, No N/V/D, no dysuria/frequency/discharge, No neck/back pain, no rash, no new focal neuro symptoms.

## 2021-04-22 NOTE — ED PROVIDER NOTE - PATIENT PORTAL LINK FT
You can access the FollowMyHealth Patient Portal offered by [facility] by registering at the following website: [email or web address]. By joining [facility]’s FollowMyHealth portal, you will also be able to view your health information using other applications (apps) compatible with our system.

## 2021-04-22 NOTE — ED PROVIDER NOTE - PHYSICAL EXAMINATION
Gen: Alert, NAD  Head: NC, AT,  EOMI, normal lids/conjunctiva  ENT:  normal hearing, patent oropharynx without erythema/exudate  Neck: +supple, no tenderness/meningismus/JVD, +Trachea midline  Chest: no chest wall tenderness, equal chest rise  Pulm: Bilateral BS, normal resp effort, no wheeze/stridor/retractions  CV: RRR, no M/R/G, +dist pulses  Abd: +BS, soft, NT/ND  Rectal: deferred  Mskel: no edema/erythema/cyanosis, mild edema of left knee, +TTP posterior Left knee, normal warmth, no erythema  Skin: no rash  Neuro: AAOx3, no sensory deficits; Left knee flexion/extension limited 2/2 to pain; CN 2-12 intact

## 2021-04-22 NOTE — ED PROVIDER NOTE - CLINICAL SUMMARY MEDICAL DECISION MAKING FREE TEXT BOX
49yo F w/ pmh as above here for acute on subacute Left knee pain. H&P most consistent w/ RA flare, but given location of pain, will r/o baker's cyst, treat pain and likely dc for continued outpt mngmt by pmd.

## 2021-04-22 NOTE — ED ADULT NURSE NOTE - OBJECTIVE STATEMENT
Pt received in room 23. Pt A&Ox4, pmhx "abnormal heart rhythm" on xarelto, currently being worked up for RA outpatient, c/o left knee pain for the last month which has been getting worse. Pt denies any chest pain, sob, abd pain, ha, dizziness, n/v/d, fevers/chills. Respirations even and unlabored, no accessory muscle use. Abd soft non tender, non distended. Pt medicated per emar. Stretcher in lowest position, side rails up, call bell within reach.

## 2021-04-22 NOTE — ED ADULT NURSE NOTE - DISCHARGE DATE/TIME
Problem: Altered Mood, Depressive Behavior:  Goal: Absence of self-harm  Description: Absence of self-harm  11/4/2020 [id number] by Matt Will LPN  Outcome: Ongoing   Patient denies thoughts of self harm, remains free from self harm. Support and encouragement provided. 22-Apr-2021 07:17

## 2021-04-22 NOTE — ED ADULT TRIAGE NOTE - CHIEF COMPLAINT QUOTE
c/o Left leg pain x a few days with severe pain starting today 2/2 RA, unable to ambulate independently, took prednisone and methotrexate today with no relief

## 2021-04-25 ENCOUNTER — TRANSCRIPTION ENCOUNTER (OUTPATIENT)
Age: 51
End: 2021-04-25

## 2021-04-25 ENCOUNTER — INPATIENT (INPATIENT)
Facility: HOSPITAL | Age: 51
LOS: 1 days | Discharge: HOME | DRG: 554 | End: 2021-04-27
Attending: INTERNAL MEDICINE | Admitting: INTERNAL MEDICINE
Payer: MEDICAID

## 2021-04-25 VITALS
HEIGHT: 66 IN | WEIGHT: 197.09 LBS | SYSTOLIC BLOOD PRESSURE: 132 MMHG | RESPIRATION RATE: 20 BRPM | TEMPERATURE: 98 F | HEART RATE: 84 BPM | DIASTOLIC BLOOD PRESSURE: 88 MMHG

## 2021-04-25 DIAGNOSIS — M25.562 PAIN IN LEFT KNEE: ICD-10-CM

## 2021-04-25 DIAGNOSIS — Z90.710 ACQUIRED ABSENCE OF BOTH CERVIX AND UTERUS: Chronic | ICD-10-CM

## 2021-04-25 DIAGNOSIS — I48.20 CHRONIC ATRIAL FIBRILLATION, UNSPECIFIED: ICD-10-CM

## 2021-04-25 DIAGNOSIS — I10 ESSENTIAL (PRIMARY) HYPERTENSION: ICD-10-CM

## 2021-04-25 LAB
ALBUMIN SERPL ELPH-MCNC: 4 G/DL — SIGNIFICANT CHANGE UP (ref 3.3–5)
ALP SERPL-CCNC: 144 U/L — HIGH (ref 40–120)
ALT FLD-CCNC: 39 U/L — SIGNIFICANT CHANGE UP (ref 10–45)
ANION GAP SERPL CALC-SCNC: 14 MMOL/L — SIGNIFICANT CHANGE UP (ref 5–17)
AST SERPL-CCNC: 16 U/L — SIGNIFICANT CHANGE UP (ref 10–40)
BASOPHILS # BLD AUTO: 0.08 K/UL — SIGNIFICANT CHANGE UP (ref 0–0.2)
BASOPHILS NFR BLD AUTO: 0.6 % — SIGNIFICANT CHANGE UP (ref 0–2)
BILIRUB SERPL-MCNC: 0.3 MG/DL — SIGNIFICANT CHANGE UP (ref 0.2–1.2)
BUN SERPL-MCNC: 8 MG/DL — SIGNIFICANT CHANGE UP (ref 7–23)
CALCIUM SERPL-MCNC: 9.1 MG/DL — SIGNIFICANT CHANGE UP (ref 8.4–10.5)
CHLORIDE SERPL-SCNC: 104 MMOL/L — SIGNIFICANT CHANGE UP (ref 96–108)
CK SERPL-CCNC: 66 U/L — SIGNIFICANT CHANGE UP (ref 25–170)
CO2 SERPL-SCNC: 22 MMOL/L — SIGNIFICANT CHANGE UP (ref 22–31)
CREAT SERPL-MCNC: 0.63 MG/DL — SIGNIFICANT CHANGE UP (ref 0.5–1.3)
EOSINOPHIL # BLD AUTO: 0.13 K/UL — SIGNIFICANT CHANGE UP (ref 0–0.5)
EOSINOPHIL NFR BLD AUTO: 1 % — SIGNIFICANT CHANGE UP (ref 0–6)
GLUCOSE SERPL-MCNC: 93 MG/DL — SIGNIFICANT CHANGE UP (ref 70–99)
HCT VFR BLD CALC: 38.3 % — SIGNIFICANT CHANGE UP (ref 34.5–45)
HGB BLD-MCNC: 12.4 G/DL — SIGNIFICANT CHANGE UP (ref 11.5–15.5)
IMM GRANULOCYTES NFR BLD AUTO: 0.3 % — SIGNIFICANT CHANGE UP (ref 0–1.5)
LYMPHOCYTES # BLD AUTO: 1.95 K/UL — SIGNIFICANT CHANGE UP (ref 1–3.3)
LYMPHOCYTES # BLD AUTO: 15.4 % — SIGNIFICANT CHANGE UP (ref 13–44)
MCHC RBC-ENTMCNC: 28.8 PG — SIGNIFICANT CHANGE UP (ref 27–34)
MCHC RBC-ENTMCNC: 32.4 GM/DL — SIGNIFICANT CHANGE UP (ref 32–36)
MCV RBC AUTO: 88.9 FL — SIGNIFICANT CHANGE UP (ref 80–100)
MONOCYTES # BLD AUTO: 0.93 K/UL — HIGH (ref 0–0.9)
MONOCYTES NFR BLD AUTO: 7.4 % — SIGNIFICANT CHANGE UP (ref 2–14)
NEUTROPHILS # BLD AUTO: 9.5 K/UL — HIGH (ref 1.8–7.4)
NEUTROPHILS NFR BLD AUTO: 75.3 % — SIGNIFICANT CHANGE UP (ref 43–77)
NRBC # BLD: 0 /100 WBCS — SIGNIFICANT CHANGE UP (ref 0–0)
PLATELET # BLD AUTO: 273 K/UL — SIGNIFICANT CHANGE UP (ref 150–400)
POTASSIUM SERPL-MCNC: 4.1 MMOL/L — SIGNIFICANT CHANGE UP (ref 3.5–5.3)
POTASSIUM SERPL-SCNC: 4.1 MMOL/L — SIGNIFICANT CHANGE UP (ref 3.5–5.3)
PROT SERPL-MCNC: 7 G/DL — SIGNIFICANT CHANGE UP (ref 6–8.3)
RBC # BLD: 4.31 M/UL — SIGNIFICANT CHANGE UP (ref 3.8–5.2)
RBC # FLD: 13.6 % — SIGNIFICANT CHANGE UP (ref 10.3–14.5)
SODIUM SERPL-SCNC: 140 MMOL/L — SIGNIFICANT CHANGE UP (ref 135–145)
WBC # BLD: 12.63 K/UL — HIGH (ref 3.8–10.5)
WBC # FLD AUTO: 12.63 K/UL — HIGH (ref 3.8–10.5)

## 2021-04-25 PROCEDURE — 73564 X-RAY EXAM KNEE 4 OR MORE: CPT | Mod: 26,LT

## 2021-04-25 PROCEDURE — 99221 1ST HOSP IP/OBS SF/LOW 40: CPT | Mod: GC

## 2021-04-25 PROCEDURE — 99223 1ST HOSP IP/OBS HIGH 75: CPT

## 2021-04-25 PROCEDURE — 99285 EMERGENCY DEPT VISIT HI MDM: CPT

## 2021-04-25 PROCEDURE — 93971 EXTREMITY STUDY: CPT | Mod: 26,LT

## 2021-04-25 RX ORDER — LIDOCAINE 4 G/100G
1 CREAM TOPICAL EVERY 24 HOURS
Refills: 0 | Status: DISCONTINUED | OUTPATIENT
Start: 2021-04-25 | End: 2021-04-27

## 2021-04-25 RX ORDER — ACETAMINOPHEN 500 MG
650 TABLET ORAL EVERY 6 HOURS
Refills: 0 | Status: DISCONTINUED | OUTPATIENT
Start: 2021-04-25 | End: 2021-04-27

## 2021-04-25 RX ORDER — METOPROLOL TARTRATE 50 MG
25 TABLET ORAL
Refills: 0 | Status: DISCONTINUED | OUTPATIENT
Start: 2021-04-25 | End: 2021-04-27

## 2021-04-25 RX ORDER — DICLOFENAC SODIUM 75 MG/1
50 TABLET, DELAYED RELEASE ORAL THREE TIMES A DAY
Refills: 0 | Status: DISCONTINUED | OUTPATIENT
Start: 2021-04-25 | End: 2021-04-27

## 2021-04-25 RX ORDER — KETOROLAC TROMETHAMINE 30 MG/ML
15 SYRINGE (ML) INJECTION ONCE
Refills: 0 | Status: DISCONTINUED | OUTPATIENT
Start: 2021-04-25 | End: 2021-04-25

## 2021-04-25 RX ORDER — LIDOCAINE 4 G/100G
1 CREAM TOPICAL ONCE
Refills: 0 | Status: COMPLETED | OUTPATIENT
Start: 2021-04-25 | End: 2021-04-25

## 2021-04-25 RX ORDER — RIVAROXABAN 15 MG-20MG
20 KIT ORAL
Refills: 0 | Status: DISCONTINUED | OUTPATIENT
Start: 2021-04-25 | End: 2021-04-27

## 2021-04-25 RX ORDER — METOPROLOL TARTRATE 50 MG
50 TABLET ORAL AT BEDTIME
Refills: 0 | Status: DISCONTINUED | OUTPATIENT
Start: 2021-04-25 | End: 2021-04-27

## 2021-04-25 RX ADMIN — Medication 650 MILLIGRAM(S): at 22:24

## 2021-04-25 RX ADMIN — Medication 15 MILLIGRAM(S): at 18:28

## 2021-04-25 RX ADMIN — Medication 650 MILLIGRAM(S): at 22:52

## 2021-04-25 RX ADMIN — Medication 50 MILLIGRAM(S): at 23:08

## 2021-04-25 RX ADMIN — DICLOFENAC SODIUM 50 MILLIGRAM(S): 75 TABLET, DELAYED RELEASE ORAL at 22:52

## 2021-04-25 RX ADMIN — Medication 15 MILLIGRAM(S): at 15:12

## 2021-04-25 RX ADMIN — LIDOCAINE 1 PATCH: 4 CREAM TOPICAL at 15:12

## 2021-04-25 NOTE — CONSULT NOTE ADULT - ASSESSMENT
A/P: 50y Female with mild arthritis of left knee    -Pain control as needed  -Antiinflammatories PRN  -WBAT LLE   -PT/OT  -DVT PE ppx per primary team  -No orthopedic surgical intervention needed at this time  -Will discuss with attending and will advise if plan changes  -Ortho stable

## 2021-04-25 NOTE — ED PROVIDER NOTE - CLINICAL SUMMARY MEDICAL DECISION MAKING FREE TEXT BOX
Adiel Bellamy MD, FACEP: In this physician's medical judgement based on clinical history and physical exam, pt on Xarelto with 1 month of LLE pain. Reports non ambulatory at home for a month but walking with daughter. Pt able to do activities of daily living otherwise. Will obtain CBC CMP and CK. Will do US to eval for acute DVT. Will d/w pt PMD and likely f/u outpt.

## 2021-04-25 NOTE — DISCHARGE NOTE PROVIDER - CARE PROVIDER_API CALL
Yasmeen (Rheumatology),   Phone: (   )    -  Fax: (   )    -  Established Patient  Follow Up Time: 1 week    Kyle (Primary Care),   Phone: (   )    -  Fax: (   )    -  Established Patient  Follow Up Time: 1 week   Yasmeen (Rheumatology),   Phone: (   )    -  Fax: (   )    -  Established Patient  Follow Up Time: 1 week    Kyle (Primary Care),   Phone: (   )    -  Fax: (   )    -  Established Patient  Follow Up Time: 1 week    Lux Dunaway)  Orthopaedic Surgery  [address], [address]  [place] [address]  Phone: [phone]  Fax: [phone]  Follow Up Time:

## 2021-04-25 NOTE — ED PROVIDER NOTE - PROGRESS NOTE DETAILS
Debbi, PGY1: Spoke with patient PMD Dr. Quezada. Pt was seen in office and evaluated for knee pain over the last month. No additional concerns at this time. Patient can follow up as outpatient. Debbi, PGY1: Attempts to ambulate patient unsuccessful. Extensive discussion with patient/son about utilizing crutches as well as soft casting of knee joint. Patient continues to endorse severe pain unchanged with home opiates as well as nsaids.  Offered urgent outpatient follow up with ortho, however, states she does not feel comfortable going home as she is unable to walk in her home without full assistance.

## 2021-04-25 NOTE — H&P ADULT - NSHPPHYSICALEXAM_GEN_ALL_CORE
T(C): 36.8 (04-25-21 @ 20:12), Max: 36.8 (04-25-21 @ 12:31)  HR: 82 (04-25-21 @ 20:12) (82 - 84)  BP: 115/62 (04-25-21 @ 20:12) (115/62 - 138/87)  RR: 17 (04-25-21 @ 20:12) (17 - 20)  SpO2: 100% (04-25-21 @ 20:12) (99% - 100%)    Gen: (fe)male in NAD, appears comfortable, no diaphoresis  HEENT: NCAT, MMM, neck soft and supple  CV: +S1/S2, no m/r/g  Resp: CTAB, no w/r/r  GI: normoactive BS, soft, NTND, no rebounding/guarding  Ext: No LE edema, extremities appear warm and well perfused   Neuro: AOx3, no focal deficits, CNII-XII grossly intact  Psych: No SI/HI/AVH, appropriate affect  Skin: no petechiae, ecchymosis or maculopapular rash noted T(C): 36.8 (04-25-21 @ 20:12), Max: 36.8 (04-25-21 @ 12:31)  HR: 82 (04-25-21 @ 20:12) (82 - 84)  BP: 115/62 (04-25-21 @ 20:12) (115/62 - 138/87)  RR: 17 (04-25-21 @ 20:12) (17 - 20)  SpO2: 100% (04-25-21 @ 20:12) (99% - 100%)    Gen: female in NAD, appears comfortable, no diaphoresis  HEENT: NCAT, MMM, neck soft and supple  CV: +S1/S2, no m/r/g  Resp: CTAB, no w/r/r  GI: normoactive BS, soft, NTND, no rebounding/guarding  Ext: No LE edema, extremities appear warm and well perfused   Neuro: AOx3, no focal deficits, CNII-XII grossly intact  Psych: No SI/HI/AVH, appropriate affect  Skin: no petechiae, ecchymosis or maculopapular rash noted  MSK: unable to flex knee fully both passively and actively, no effusion noted, no joint laxity

## 2021-04-25 NOTE — H&P ADULT - ASSESSMENT
50F with PMHx of HTN and chronic atrial fibrillation presents with one month history of left knee pain. Exam significant for poor ROM both actively and passively to the right knee secondary to pain with no joint laxity or swelling. Left knee XR showing mild left knee arthrosis. Patient being admitted for pain and difficulty with ambulation. 50F with PMHx of HTN and chronic atrial fibrillation presents with one month history of left knee pain. Exam significant for poor ROM both actively and passively to the left knee secondary to pain with no joint laxity or swelling. Left knee XR showing mild left knee arthrosis. Patient being admitted for pain and difficulty with ambulation.

## 2021-04-25 NOTE — ED PROVIDER NOTE - ATTENDING CONTRIBUTION TO CARE
Adiel Bellamy MD, FACEP: In this physician's medical judgement based on clinical history and physical exam, patient with remote trauma to left knee and worsening pain and limitation of rom and activities of daily living secondary to pain and inability to walk without assistance per patient and family. Will get iv, cbc, cmp, ultrasound left lower extremity, xray knee, analgesia prn.   Will follow up on labs, analgesia, imaging, reassess and disposition as clinically indicated.   neg ultrasound for dvt, xray without fractures, patient in significant pain and reporting inability to ambulate.   patient may require admission for knee pain preventing activities of daily living

## 2021-04-25 NOTE — ED PROVIDER NOTE - CARE PLAN
Principal Discharge DX:	Knee pain, left   Principal Discharge DX:	Knee pain, left  Secondary Diagnosis:	Ambulatory dysfunction

## 2021-04-25 NOTE — DISCHARGE NOTE PROVIDER - PROVIDER TOKENS
FREE:[LAST:[Yasmeen (Rheumatology)],PHONE:[(   )    -],FAX:[(   )    -],FOLLOWUP:[1 week],ESTABLISHEDPATIENT:[T]],FREE:[LAST:[Kyle (Primary Care)],PHONE:[(   )    -],FAX:[(   )    -],FOLLOWUP:[1 week],ESTABLISHEDPATIENT:[T]] FREE:[LAST:[Yasmeen (Rheumatology)],PHONE:[(   )    -],FAX:[(   )    -],FOLLOWUP:[1 week],ESTABLISHEDPATIENT:[T]],FREE:[LAST:[Kyle (Primary Care)],PHONE:[(   )    -],FAX:[(   )    -],FOLLOWUP:[1 week],ESTABLISHEDPATIENT:[T]],PROVIDER:[TOKEN:[[id number]]]

## 2021-04-25 NOTE — H&P ADULT - PROBLEM SELECTOR PLAN 1
-Pain control with standing Tylenol and PRN Voltaren with Lidocaine patch  -PT consult  -Ortho consult (called personally)  -Outpatient follow up with PMD, Rheumatology, and Ortho  -Prepare for discharge, will re-evaluate in AM and likely discharge (son aware) -Pain control with standing Tylenol and PRN Voltaren with Lidocaine patch  -PT consult  -Ortho consult (called personally)  -Outpatient follow up with PMD, Rheumatology, and Ortho  -Prepare for discharge, will re-evaluate in AM and likely discharge (son aware)  -Had extensive discussion with patient and son about the utility and benefit from inpatient management. I expressed empathy with patient's difficulty with ambulating and pain, but this itself does not necessarily mean it is not safe to be discharged. In addition, we discussed how chronic pain and musculoskeletal issues require long term follow up and titration of medications, possible outpatient therapy all of which require a setting of longitudinal care. Patient & son expressed understanding, but are understandably frustrated.

## 2021-04-25 NOTE — ED ADULT NURSE NOTE - OBJECTIVE STATEMENT
PT is a 50 year old female with PMH, HTN, on xarelto for Afib.  Pt presents to ED with c/o left sided leg pain and swelling.  PT alert and oriented x 3. Pt denies any SOB or chest pain.  Skin warm and dry.  Left leg does not look inflamed or warmer than any other part of her body.

## 2021-04-25 NOTE — H&P ADULT - HISTORY OF PRESENT ILLNESS
50F with PMHx of HTN and chronic atrial fibrillation presents with one month history of left knee pain. Patient states left knee pain began one month prior and occurs when she ambulates. She has found it difficult to flex at the left knee. She denies swelling or trauma. Pain is not too severe at rest and exacerbated by movement. She denies calf swelling. She went to a rheumatologist who diagnosed her with arthritis and prescribed her a trial of Methotrexate and Prednisone with little effect. She has attempted Tylenol and Tramadol with minimal pain relief. She was seen in the ED with XR of left knee showing minimal arthrosis. DVT study was negative for DVT. Patient was to follow up with her PMD for continued management, but refusing to ambulate given pain.     I spoke on the phone with son. She has been having trouble ambulating around the house. She requires assistance from daughter-in-law. In the ED patient given Ketorolac and Lidocaine.

## 2021-04-25 NOTE — DISCHARGE NOTE PROVIDER - HOSPITAL COURSE
Stable  Continue same  Will continue to monitor    Continue follow-up with cardiologist  50F with PMHx of HTN and chronic atrial fibrillation presents with one month history of left knee pain. Patient states left knee pain began one month prior and occurs when she ambulates. She has found it difficult to flex at the left knee. Patient with left knee XR showing mild arthrosis. She was started on Tylenol ATC with PRN Voltaren. Ortho surgery consulted____    Patient and son counseled extensively that this may require many outpatient follow ups as this is likely a chronic condition with need for persistent follow up. Patient and son Veenu aware and acknowledge the situation. 50F with PMHx of HTN and chronic atrial fibrillation presents with one month history of left knee pain. Patient states left knee pain began one month prior and occurs when she ambulates. She has found it difficult to flex at the left knee. Patient with left knee XR showing mild arthrosis. She was started on Tylenol ATC with PRN Voltaren. Ortho surgery consulted: Pain control as needed, Antiinflammatories PRN  -WBAT LLE   -PT/OT  -DVT PE ppx per primary team  -No orthopedic surgical intervention needed at this time  -Ortho stable       Patient and son counseled extensively that this may require many outpatient follow ups as this is likely a chronic condition with need for persistent follow up. Patient and son Veenu aware and acknowledge the situation.  MRI performed , and hospitalist Dr. Oneal will follow results. Pt is medically stable for discharge with follow up with her PMD, Rheum and Ortho

## 2021-04-25 NOTE — ED PROVIDER NOTE - OBJECTIVE STATEMENT
51 year old F with pmhx of Afib on Xarelto x3 months and HTN presents to ED c/o L calf pain x1 month, worsening over the last 5 days. Pt reports no triggering factor, denies fall or trauma to site 1 month ago. Pt states that pain worsened over the last 4 days, notes no new activity or trauma. Pain described as "heaviness" in nature. Pain radiates to knee. Pain worse when bending knee. Endorses difficulty walking 2/2 pain, states she needs to hold her daughter in laws hand in order to ambulate. Saw her PMD who started pt on prednisone with no relief. Took Tylenol at 10am with no improvement. Denies back pain and ankle pain. Has not yet seen a rheumatologist.  Denies hx of blood clots. Not on OCPs. Not on hormonal supplements. NKDA.

## 2021-04-25 NOTE — ED PROVIDER NOTE - PHYSICAL EXAMINATION
GEN: NAD, awake, eyes open spontaneously  EYES: normal conjunctiva, perrl  CHEST/LUNGS: Non-tachypneic, CTAB, bilateral breath sounds  CARDIAC: Non-tachycardic, normal perfusion  ABDOMEN: Soft, NTND, No rebound/guarding  MSK: Hesitant ROM of left knee 2/2 pain. Otherwise full passive ROM. No skin changes. No warmth or erythema of extremities. Sensation intact. DP pulses intact.   SKIN: No rashes, no petechiae, no vesicles  NEURO: CN grossly intact, normal coordination, no focal motor or sensory deficits GEN: moderate distress, moaning to pain, awake, eyes open spontaneously  EYES: normal conjunctiva, perrl  CHEST/LUNGS: Non-tachypneic, CTAB, bilateral breath sounds  CARDIAC: Non-tachycardic, normal perfusion  ABDOMEN: Soft, NTND, No rebound/guarding  MSK: Hesitant ROM of left knee 2/2 pain. Otherwise full passive ROM. No skin changes. No warmth or erythema of extremities. Sensation intact. DP pulses intact.   SKIN: No rashes, no petechiae, no vesicles  NEURO: CN grossly intact, normal coordination, no focal motor or sensory deficits

## 2021-04-25 NOTE — DISCHARGE NOTE PROVIDER - NSDCMRMEDTOKEN_GEN_ALL_CORE_FT
aspirin 81 mg oral tablet: 1 tab(s) orally once a day  metoprolol tartrate 25 mg oral tablet: 1 tab(s) orally once a day AM  Metoprolol Tartrate 50 mg oral tablet: 1 tab(s) orally once a day (at bedtime)  Xarelto 20 mg oral tablet: 1 tab(s) orally once a day (in the evening)   aspirin 81 mg oral tablet: 1 tab(s) orally once a day  metoprolol tartrate 25 mg oral tablet: 1 tab(s) orally once a day AM  Metoprolol Tartrate 50 mg oral tablet: 1 tab(s) orally once a day (at bedtime)  Roller Walker :   Xarelto 20 mg oral tablet: 1 tab(s) orally once a day (in the evening)   aspirin 81 mg oral tablet: 1 tab(s) orally once a day  metoprolol tartrate 25 mg oral tablet: 1 tab(s) orally   metoprolol tartrate 50 mg oral tablet: 1 tab(s) orally once a day (at bedtime)  physical therapy :   Audrey Giraldo :   Xarelto 20 mg oral tablet: 1 tab(s) orally once a day (in the evening)   acetaminophen 325 mg oral tablet: 2 tab(s) orally every 6 hours  diclofenac sodium 50 mg oral delayed release tablet: 1 tab(s) orally 3 times a day, As needed, Severe Pain (7 - 10)  metoprolol tartrate 25 mg oral tablet: 1 tab(s) orally   metoprolol tartrate 50 mg oral tablet: 1 tab(s) orally once a day (at bedtime)  physical therapy :   Audrey Walker :   Xarelto 20 mg oral tablet: 1 tab(s) orally once a day (in the evening)

## 2021-04-25 NOTE — DISCHARGE NOTE PROVIDER - CARE PROVIDERS DIRECT ADDRESSES
,DirectAddress_Unknown,DirectAddress_Unknown ,DirectAddress_Unknown,DirectAddress_Unknown,[email or web address]

## 2021-04-25 NOTE — CONSULT NOTE ADULT - SUBJECTIVE AND OBJECTIVE BOX
50y Female community ambulator presents c/o LEFT knee/calf pain for the last 1 month that has reported gotten worse over the last 4 days. Pt denies injury/trauma. Denies numbness/tingling. Denies fever/chills. Denies pain/injury elsewhere. Patient ambulates without assistance at baseline. Pt reports difficulty with ambulation 2/2 pain.    HEALTH ISSUES - PROBLEM Dx:  Knee pain, left  Knee pain, left    Chronic atrial fibrillation  Chronic atrial fibrillation    Hypertension  Hypertension          PAST MEDICAL & SURGICAL HISTORY:  Chronic atrial fibrillation    Hypertension    No significant past surgical history      MEDICATIONS  (STANDING):  acetaminophen   Tablet .. 650 milliGRAM(s) Oral every 6 hours  lidocaine   Patch 1 Patch Transdermal every 24 hours  metoprolol tartrate 25 milliGRAM(s) Oral <User Schedule>  metoprolol tartrate 50 milliGRAM(s) Oral at bedtime  rivaroxaban 20 milliGRAM(s) Oral with dinner    Allergies    No Known Allergies    Intolerances                            12.4   12.63 )-----------( 273      ( 25 Apr 2021 15:04 )             38.3     25 Apr 2021 15:04    140    |  104    |  8      ----------------------------<  93     4.1     |  22     |  0.63     Ca    9.1        25 Apr 2021 15:04    TPro  7.0    /  Alb  4.0    /  TBili  0.3    /  DBili  x      /  AST  16     /  ALT  39     /  AlkPhos  144    25 Apr 2021 15:04      Vital Signs Last 24 Hrs  T(C): 36.8 (04-25-21 @ 20:12), Max: 36.8 (04-25-21 @ 12:31)  T(F): 98.2 (04-25-21 @ 20:12), Max: 98.3 (04-25-21 @ 12:31)  HR: 82 (04-25-21 @ 20:12) (82 - 84)  BP: 115/62 (04-25-21 @ 20:12) (115/62 - 138/87)  BP(mean): --  RR: 17 (04-25-21 @ 20:12) (17 - 20)  SpO2: 100% (04-25-21 @ 20:12) (99% - 100%)      PHYSICAL EXAM  General: NAD, Awake and Alert    RIGHT Lower Extremity:   Skin intact, no erythema, ecchymosis, edema, or gross deformity   NTTP over the bony prominences of the hip/knee/ankle/foot/toes  TTP over the proximal calf   No palpable knee effusion  Painless passive/active ROM of the hip/knee/ankle/foot  L2-S1 SILT  Motor grossly intact throughout hip flexors/quads/hams/TA/EHL/FHL/GSC  + DP pulses  Compartments soft and compressible      Secondary Exam: Benign, Skin intact, SILT throughout, motor grossly intact throughout, no other orthopedic injuries at this time, compartments soft and compressible      IMAGING   XR LEFT Knee: Demonstrates mild medial joint space narrowing. No effusion. No evidence of fracture or dislocation.  LLE Dopp: No evidence of deep vein thrombosis.     50y Female community ambulator presents c/o LEFT knee/calf pain for the last 1 month that has reported gotten worse over the last 4 days. Pt denies injury/trauma. Denies numbness/tingling. Denies fever/chills. Denies pain/injury elsewhere. Patient ambulates without assistance at baseline. Pt reports difficulty with ambulation 2/2 pain.    HEALTH ISSUES - PROBLEM Dx:  Knee pain, left  Knee pain, left    Chronic atrial fibrillation  Chronic atrial fibrillation    Hypertension  Hypertension          PAST MEDICAL & SURGICAL HISTORY:  Chronic atrial fibrillation    Hypertension    No significant past surgical history      MEDICATIONS  (STANDING):  acetaminophen   Tablet .. 650 milliGRAM(s) Oral every 6 hours  lidocaine   Patch 1 Patch Transdermal every 24 hours  metoprolol tartrate 25 milliGRAM(s) Oral <User Schedule>  metoprolol tartrate 50 milliGRAM(s) Oral at bedtime  rivaroxaban 20 milliGRAM(s) Oral with dinner    Allergies    No Known Allergies    Intolerances                            12.4   12.63 )-----------( 273      ( 25 Apr 2021 15:04 )             38.3     25 Apr 2021 15:04    140    |  104    |  8      ----------------------------<  93     4.1     |  22     |  0.63     Ca    9.1        25 Apr 2021 15:04    TPro  7.0    /  Alb  4.0    /  TBili  0.3    /  DBili  x      /  AST  16     /  ALT  39     /  AlkPhos  144    25 Apr 2021 15:04      Vital Signs Last 24 Hrs  T(C): 36.8 (04-25-21 @ 20:12), Max: 36.8 (04-25-21 @ 12:31)  T(F): 98.2 (04-25-21 @ 20:12), Max: 98.3 (04-25-21 @ 12:31)  HR: 82 (04-25-21 @ 20:12) (82 - 84)  BP: 115/62 (04-25-21 @ 20:12) (115/62 - 138/87)  BP(mean): --  RR: 17 (04-25-21 @ 20:12) (17 - 20)  SpO2: 100% (04-25-21 @ 20:12) (99% - 100%)      PHYSICAL EXAM  General: Awake and Alert    RIGHT Lower Extremity:   Skin intact, no erythema, ecchymosis, edema, or gross deformity   NTTP over the bony prominences of the hip/knee/ankle/foot/toes  TTP over the proximal calf   No palpable knee effusion  Painless passive/active ROM of the hip/knee/ankle/foot  L2-S1 SILT  Motor grossly intact throughout hip flexors/quads/hams/TA/EHL/FHL/GSC  + DP pulses  Compartments soft and compressible      Secondary Exam: Benign, Skin intact, SILT throughout, motor grossly intact throughout, no other orthopedic injuries at this time, compartments soft and compressible      IMAGING   XR LEFT Knee: Demonstrates mild medial joint space narrowing. No effusion. No evidence of fracture or dislocation.  LLE Dopp: No evidence of deep vein thrombosis.     04-Jan-2020 19:10

## 2021-04-25 NOTE — DISCHARGE NOTE PROVIDER - NSDCCPCAREPLAN_GEN_ALL_CORE_FT
PRINCIPAL DISCHARGE DIAGNOSIS  Diagnosis: Arthrosis  Assessment and Plan of Treatment:       SECONDARY DISCHARGE DIAGNOSES  Diagnosis: Ambulatory dysfunction  Assessment and Plan of Treatment:      PRINCIPAL DISCHARGE DIAGNOSIS  Diagnosis: Knee pain, left  Assessment and Plan of Treatment: Pain control as needed  -Antiinflammatories PRN  -WBAT LLE   -continue with physical therapy   -No orthopedic surgical intervention needed at this time  Please follow up with your rheumatologist  -can continue methotrexate as outpatient and  prednisone per rheum.      SECONDARY DISCHARGE DIAGNOSES  Diagnosis: Chronic atrial fibrillation  Assessment and Plan of Treatment: Atrial fibrillation is the most common heart rhythm problem.  The condition puts you at risk for has stroke and heart attack  It helps if you control your blood pressure, not drink more than 1-2 alcohol drinks per day, cut down on caffeine, getting treatment for over active thyroid gland, and get regular exercise  Call your doctor if you feel your heart racing or beating unusually, chest tightness or pain, lightheaded, faint, shortness of breath especially with exercise  It is important to take your heart medication as prescribed  You may be on anticoagulation which is very important to take as directed - you may need blood work to monitor drug levels      Diagnosis: Hypertension  Assessment and Plan of Treatment: Low salt diet  Activity as tolerated.  Take all medication as prescribed.  Follow up with your medical doctor for routine blood pressure monitoring at your next visit.  Notify your doctor if you have any of the following symptoms:   Dizziness, Lightheadedness, Blurry vision, Headache, Chest pain, Shortness of breath      Diagnosis: Ambulatory dysfunction  Assessment and Plan of Treatment: rolling walker ordered and will be delivered to your house     PRINCIPAL DISCHARGE DIAGNOSIS  Diagnosis: Knee pain, left  Assessment and Plan of Treatment: Pain control as needed  -Antiinflammatories PRN  -WBAT LLE   -continue with physical therapy   -No orthopedic surgical intervention needed at this time  Please follow up with your rheumatologist  -can continue methotrexate as outpatient and  prednisone per rheum.  Iraj Rodríguez will follow up with our MRI results, if any abnormality will call you      SECONDARY DISCHARGE DIAGNOSES  Diagnosis: Chronic atrial fibrillation  Assessment and Plan of Treatment: Atrial fibrillation is the most common heart rhythm problem.  The condition puts you at risk for has stroke and heart attack  It helps if you control your blood pressure, not drink more than 1-2 alcohol drinks per day, cut down on caffeine, getting treatment for over active thyroid gland, and get regular exercise  Call your doctor if you feel your heart racing or beating unusually, chest tightness or pain, lightheaded, faint, shortness of breath especially with exercise  It is important to take your heart medication as prescribed  You may be on anticoagulation which is very important to take as directed - you may need blood work to monitor drug levels      Diagnosis: Hypertension  Assessment and Plan of Treatment: Low salt diet  Activity as tolerated.  Take all medication as prescribed.  Follow up with your medical doctor for routine blood pressure monitoring at your next visit.  Notify your doctor if you have any of the following symptoms:   Dizziness, Lightheadedness, Blurry vision, Headache, Chest pain, Shortness of breath      Diagnosis: Ambulatory dysfunction  Assessment and Plan of Treatment: rolling walker ordered and will be delivered to your house

## 2021-04-26 LAB
COVID-19 SPIKE DOMAIN AB INTERP: POSITIVE
COVID-19 SPIKE DOMAIN ANTIBODY RESULT: >250 U/ML — HIGH
SARS-COV-2 IGG+IGM SERPL QL IA: >250 U/ML — HIGH
SARS-COV-2 IGG+IGM SERPL QL IA: POSITIVE
SARS-COV-2 RNA SPEC QL NAA+PROBE: SIGNIFICANT CHANGE UP

## 2021-04-26 PROCEDURE — 73590 X-RAY EXAM OF LOWER LEG: CPT | Mod: 26,LT

## 2021-04-26 PROCEDURE — 99232 SBSQ HOSP IP/OBS MODERATE 35: CPT

## 2021-04-26 RX ADMIN — LIDOCAINE 1 PATCH: 4 CREAM TOPICAL at 05:22

## 2021-04-26 RX ADMIN — Medication 50 MILLIGRAM(S): at 21:41

## 2021-04-26 RX ADMIN — LIDOCAINE 1 PATCH: 4 CREAM TOPICAL at 03:50

## 2021-04-26 RX ADMIN — LIDOCAINE 1 PATCH: 4 CREAM TOPICAL at 17:26

## 2021-04-26 RX ADMIN — Medication 650 MILLIGRAM(S): at 17:35

## 2021-04-26 RX ADMIN — RIVAROXABAN 20 MILLIGRAM(S): KIT at 17:35

## 2021-04-26 RX ADMIN — Medication 650 MILLIGRAM(S): at 12:50

## 2021-04-26 RX ADMIN — Medication 650 MILLIGRAM(S): at 12:01

## 2021-04-26 RX ADMIN — LIDOCAINE 1 PATCH: 4 CREAM TOPICAL at 08:15

## 2021-04-26 RX ADMIN — Medication 25 MILLIGRAM(S): at 12:00

## 2021-04-26 NOTE — PHYSICAL THERAPY INITIAL EVALUATION ADULT - LIVES WITH, PROFILE
Pt resides with son and DIL in private home with 4 steps to enter with HR assist and no stairs to negotiate inside the home. Pt reports being functionally independent in all aspects of functional mobility and self care x1 month ago, requiring assist from DIL for functional mobility due to L knee pain over the last few weeks./children

## 2021-04-26 NOTE — PHYSICAL THERAPY INITIAL EVALUATION ADULT - PERTINENT HX OF CURRENT PROBLEM, REHAB EVAL
49 yo F with PMHx of HTN and chronic afib presents [facility] 4/25/21 with one month history of left knee pain. Patient states left knee pain began one month prior and occurs when she ambulates. She has found it difficult to flex at the left knee. She denies swelling or trauma. Pain is not too severe at rest and exacerbated by movement. She denies calf swelling. CONTINUED:

## 2021-04-26 NOTE — CHART NOTE - NSCHARTNOTEFT_GEN_A_CORE
Case reviewed with Dr. Dunaway. May obtain MRI w/wo contrast of the knee and tib-fib for further imaging investigation. Will order MRI and follow results. Pt may have MRI performed outpatient if leaving today.

## 2021-04-26 NOTE — PHYSICAL THERAPY INITIAL EVALUATION ADULT - PRECAUTIONS/LIMITATIONS, REHAB EVAL
She went to a rheumatologist who diagnosed her with arthritis and prescribed her a trial of Methotrexate and Prednisone with little effect. She has attempted Tylenol and Tramadol with minimal pain relief. She was seen in the ED with XR of left knee showing minimal arthrosis. DVT study was negative for DVT. Patient was to follow up with her PMD for continued management, but refusing to ambulate given pain. Pt admitted to floor for further workup for L knee pain and difficulty with ambulation, currently pending MRI to r/o abnormal pathology and/or stress fx LLE./fall precautions

## 2021-04-26 NOTE — PHYSICAL THERAPY INITIAL EVALUATION ADULT - DIAGNOSIS, PT EVAL
Pt presents with L knee pain, mild strength impairments and decreased balance and endurance impacting ability to perform ADLs and functional mobility

## 2021-04-26 NOTE — PHYSICAL THERAPY INITIAL EVALUATION ADULT - PLANNED THERAPY INTERVENTIONS, PT EVAL
STIAR GOAL: Pt will negotiate 1 FOS independently with HR assist within 3-4 wks./balance training/gait training/transfer training

## 2021-04-26 NOTE — PHYSICAL THERAPY INITIAL EVALUATION ADULT - ADDITIONAL COMMENTS
B LE Vein Scan Duplex 4/25/21 IMPRESSION: No evidence of left lower extremity deep venous thrombosis.  X-Ray L Knee 4/25/21 IMPRESSION: No acute fracture or dislocation. Mild left knee arthrosis.  X-Ray Tibia & Fibula 4/26/21 IMPRESSION: No acute fracture or dislocation.  *Patient pending MRI to r/o abnormal pathology and/or stress fracture tib/fib L LE

## 2021-04-26 NOTE — PHYSICAL THERAPY INITIAL EVALUATION ADULT - BALANCE TRAINING, PT EVAL
GOAL: Pt will improve dynamic standing balance by at least 1/2 grade to decrease fall risk during functional mobility within 3-4 wks.

## 2021-04-27 ENCOUNTER — TRANSCRIPTION ENCOUNTER (OUTPATIENT)
Age: 51
End: 2021-04-27

## 2021-04-27 VITALS
HEART RATE: 86 BPM | OXYGEN SATURATION: 97 % | DIASTOLIC BLOOD PRESSURE: 77 MMHG | TEMPERATURE: 97 F | RESPIRATION RATE: 18 BRPM | SYSTOLIC BLOOD PRESSURE: 142 MMHG

## 2021-04-27 DIAGNOSIS — M19.90 UNSPECIFIED OSTEOARTHRITIS, UNSPECIFIED SITE: ICD-10-CM

## 2021-04-27 LAB
ANION GAP SERPL CALC-SCNC: 13 MMOL/L — SIGNIFICANT CHANGE UP (ref 5–17)
BUN SERPL-MCNC: 8 MG/DL — SIGNIFICANT CHANGE UP (ref 7–23)
CALCIUM SERPL-MCNC: 9.1 MG/DL — SIGNIFICANT CHANGE UP (ref 8.4–10.5)
CHLORIDE SERPL-SCNC: 103 MMOL/L — SIGNIFICANT CHANGE UP (ref 96–108)
CO2 SERPL-SCNC: 22 MMOL/L — SIGNIFICANT CHANGE UP (ref 22–31)
CREAT SERPL-MCNC: 0.62 MG/DL — SIGNIFICANT CHANGE UP (ref 0.5–1.3)
GLUCOSE SERPL-MCNC: 85 MG/DL — SIGNIFICANT CHANGE UP (ref 70–99)
HCT VFR BLD CALC: 39 % — SIGNIFICANT CHANGE UP (ref 34.5–45)
HGB BLD-MCNC: 12.6 G/DL — SIGNIFICANT CHANGE UP (ref 11.5–15.5)
MCHC RBC-ENTMCNC: 28.8 PG — SIGNIFICANT CHANGE UP (ref 27–34)
MCHC RBC-ENTMCNC: 32.3 GM/DL — SIGNIFICANT CHANGE UP (ref 32–36)
MCV RBC AUTO: 89.2 FL — SIGNIFICANT CHANGE UP (ref 80–100)
NRBC # BLD: 0 /100 WBCS — SIGNIFICANT CHANGE UP (ref 0–0)
PLATELET # BLD AUTO: 251 K/UL — SIGNIFICANT CHANGE UP (ref 150–400)
POTASSIUM SERPL-MCNC: 4.1 MMOL/L — SIGNIFICANT CHANGE UP (ref 3.5–5.3)
POTASSIUM SERPL-SCNC: 4.1 MMOL/L — SIGNIFICANT CHANGE UP (ref 3.5–5.3)
RBC # BLD: 4.37 M/UL — SIGNIFICANT CHANGE UP (ref 3.8–5.2)
RBC # FLD: 13.8 % — SIGNIFICANT CHANGE UP (ref 10.3–14.5)
SODIUM SERPL-SCNC: 138 MMOL/L — SIGNIFICANT CHANGE UP (ref 135–145)
WBC # BLD: 7.19 K/UL — SIGNIFICANT CHANGE UP (ref 3.8–10.5)
WBC # FLD AUTO: 7.19 K/UL — SIGNIFICANT CHANGE UP (ref 3.8–10.5)

## 2021-04-27 PROCEDURE — U0005: CPT

## 2021-04-27 PROCEDURE — 99285 EMERGENCY DEPT VISIT HI MDM: CPT

## 2021-04-27 PROCEDURE — 86769 SARS-COV-2 COVID-19 ANTIBODY: CPT

## 2021-04-27 PROCEDURE — 82550 ASSAY OF CK (CPK): CPT

## 2021-04-27 PROCEDURE — 80048 BASIC METABOLIC PNL TOTAL CA: CPT

## 2021-04-27 PROCEDURE — 73723 MRI JOINT LWR EXTR W/O&W/DYE: CPT

## 2021-04-27 PROCEDURE — 85025 COMPLETE CBC W/AUTO DIFF WBC: CPT

## 2021-04-27 PROCEDURE — 73564 X-RAY EXAM KNEE 4 OR MORE: CPT

## 2021-04-27 PROCEDURE — 96372 THER/PROPH/DIAG INJ SC/IM: CPT | Mod: XU

## 2021-04-27 PROCEDURE — 80053 COMPREHEN METABOLIC PANEL: CPT

## 2021-04-27 PROCEDURE — 85027 COMPLETE CBC AUTOMATED: CPT

## 2021-04-27 PROCEDURE — 73590 X-RAY EXAM OF LOWER LEG: CPT

## 2021-04-27 PROCEDURE — 99239 HOSP IP/OBS DSCHRG MGMT >30: CPT

## 2021-04-27 PROCEDURE — 73720 MRI LWR EXTREMITY W/O&W/DYE: CPT

## 2021-04-27 PROCEDURE — 97161 PT EVAL LOW COMPLEX 20 MIN: CPT

## 2021-04-27 PROCEDURE — 93971 EXTREMITY STUDY: CPT

## 2021-04-27 PROCEDURE — 96374 THER/PROPH/DIAG INJ IV PUSH: CPT

## 2021-04-27 PROCEDURE — U0003: CPT

## 2021-04-27 PROCEDURE — 73720 MRI LWR EXTREMITY W/O&W/DYE: CPT | Mod: 26,LT

## 2021-04-27 RX ORDER — METOPROLOL TARTRATE 50 MG
1 TABLET ORAL
Qty: 0 | Refills: 0 | DISCHARGE
Start: 2021-04-27

## 2021-04-27 RX ORDER — DICLOFENAC SODIUM 75 MG/1
1 TABLET, DELAYED RELEASE ORAL
Qty: 15 | Refills: 0
Start: 2021-04-27 | End: 2021-05-01

## 2021-04-27 RX ORDER — ACETAMINOPHEN 500 MG
2 TABLET ORAL
Qty: 0 | Refills: 0 | DISCHARGE
Start: 2021-04-27

## 2021-04-27 RX ADMIN — Medication 650 MILLIGRAM(S): at 00:00

## 2021-04-27 RX ADMIN — Medication 650 MILLIGRAM(S): at 17:15

## 2021-04-27 RX ADMIN — LIDOCAINE 1 PATCH: 4 CREAM TOPICAL at 17:50

## 2021-04-27 RX ADMIN — Medication 25 MILLIGRAM(S): at 09:53

## 2021-04-27 RX ADMIN — Medication 650 MILLIGRAM(S): at 06:50

## 2021-04-27 RX ADMIN — Medication 650 MILLIGRAM(S): at 11:42

## 2021-04-27 RX ADMIN — RIVAROXABAN 20 MILLIGRAM(S): KIT at 17:15

## 2021-04-27 RX ADMIN — LIDOCAINE 1 PATCH: 4 CREAM TOPICAL at 06:11

## 2021-04-27 RX ADMIN — Medication 650 MILLIGRAM(S): at 00:30

## 2021-04-27 RX ADMIN — Medication 650 MILLIGRAM(S): at 06:11

## 2021-04-27 RX ADMIN — LIDOCAINE 1 PATCH: 4 CREAM TOPICAL at 09:00

## 2021-04-27 NOTE — PROGRESS NOTE ADULT - PROBLEM SELECTOR PLAN 1
-Pain control with standing Tylenol and PRN Voltaren with Lidocaine patch  -PT consult-->outpatient PT  -Ortho consult consulted, recommended MR initially ordered inpatient but stated can be done either inpatient or outpatient. Discussed with son and patient who adamently felt if ortho wanted it to be performed want to have performed prior to discharge in case any intervenable findings.   -discharge hotline called today for expedition of MR. If unabel to be done 2/2 volume or emergent cases, son ok with arranging for outpatient follow up  -patietn to follow up with her  eumatologist  -can continue methotrexate as outpatient. prednisone per rheum.
-Pain control with standing Tylenol and PRN Voltaren with Lidocaine patch  -PT consult-->outpatient PT  -Ortho consult consulted, recommended MR initially ordered inpatient but stated can be done either inpatient or outpatient. Discussed with son and patient who adamently felt if ortho wanted it to be performed want to have performed prior to discharge in case any intervenable findings. Will try and expedite for tonight.

## 2021-04-27 NOTE — PROGRESS NOTE ADULT - SUBJECTIVE AND OBJECTIVE BOX
PROGRESS NOTE:   Authoted by Dr. Iraj Oneal DO  Pager [phone]     Patient is a 50y old  Female who presents with a chief complaint of Left Knee Pain (25 Apr 2021 21:49)      SUBJECTIVE / OVERNIGHT EVENTS: No events overnight. Pain is moderately improved with tylenol. Able to bend knee 90 degrees. Discussed MR inpatient vs. outpatient. If unabel to be done by afternoon, patietn will be discharged [facility] outpatient MRI.     ADDITIONAL REVIEW OF SYSTEMS:    MEDICATIONS  (STANDING):  acetaminophen   Tablet .. 650 milliGRAM(s) Oral every 6 hours  lidocaine   Patch 1 Patch Transdermal every 24 hours  metoprolol tartrate 25 milliGRAM(s) Oral <User Schedule>  metoprolol tartrate 50 milliGRAM(s) Oral at bedtime  rivaroxaban 20 milliGRAM(s) Oral with dinner    MEDICATIONS  (PRN):  diclofenac 50 milliGRAM(s) Oral three times a day PRN Severe Pain (7 - 10)      CAPILLARY BLOOD GLUCOSE        I&O's Summary    25 Apr 2021 07:01  -  26 Apr 2021 07:00  --------------------------------------------------------  IN: 0 mL / OUT: 0 mL / NET: 0 mL    26 Apr 2021 07:01  -  26 Apr 2021 13:54  --------------------------------------------------------  IN: 220 mL / OUT: 0 mL / NET: 220 mL        PHYSICAL EXAM:  Vital Signs Last 24 Hrs  T(C): 36.7 (27 Apr 2021 13:36), Max: 36.9 (26 Apr 2021 21:31)  T(F): 98 (27 Apr 2021 13:36), Max: 98.5 (26 Apr 2021 21:31)  HR: 63 (27 Apr 2021 13:36) (63 - 70)  BP: 121/85 (27 Apr 2021 13:36) (118/82 - 121/85)  BP(mean): --  RR: 18 (27 Apr 2021 13:36) (18 - 19)  SpO2: 97% (27 Apr 2021 13:36) (97% - 97%)    CONSTITUTIONAL: NAD, well-developed  RESPIRATORY: Normal respiratory effort; lungs are clear to auscultation bilaterally  CARDIOVASCULAR: Regular rate and rhythm, normal S1 and S2, no murmur/rub/gallop; No lower extremity edema; Peripheral pulses are 2+ bilaterally  ABDOMEN: Nontender to palpation, normoactive bowel sounds, no rebound/guarding; No hepatosplenomegaly  MUSCLOSKELETAL: no clubbing or cyanosis of digits; L knee tender to palpation in poseterior aspect of knee, no bakers cyst palpated.  No joint effuison, no pain in medial or lateral knee joint.   PSYCH: A+O to person, place, and time; affect appropriate    LABS:                                 12.6   7.19  )-----------( 251      ( 27 Apr 2021 07:19 )             39.0   04-27    138  |  103  |  8   ----------------------------<  85  4.1   |  22  |  0.62    Ca    9.1      27 Apr 2021 07:19      Ca    9.1      25 Apr 2021 15:04    TPro  7.0  /  Alb  4.0  /  TBili  0.3  /  DBili  x   /  AST  16  /  ALT  39  /  AlkPhos  144<H>  04-25      CARDIAC MARKERS ( 25 Apr 2021 15:04 )  x     / x     / 66 U/L / x     / x                RADIOLOGY & ADDITIONAL TESTS:  Results Reviewed:   Imaging Personally Reviewed:  Electrocardiogram Personally Reviewed:    COORDINATION OF CARE:  Care Discussed with Consultants/Other Providers [Y/N]:  Prior or Outpatient Records Reviewed [Y/N]:  
PROGRESS NOTE:   Authoted by Dr. Iraj Oneal DO  Pager [phone]     Patient is a 50y old  Female who presents with a chief complaint of Left Knee Pain (25 Apr 2021 21:49)      SUBJECTIVE / OVERNIGHT EVENTS: Patient admitted overnight for acute on chronic left knee pain. Pain is in the posterior portion of the knee without effusion. Patient states pain slightly better than yesterday but still severe.  Saw rheumatologist as outpatietn who prescribed methotrexate and prednisone. Seen by orthopedists who recommend MRI of knee either inpatietn or outpatient.     ADDITIONAL REVIEW OF SYSTEMS:    MEDICATIONS  (STANDING):  acetaminophen   Tablet .. 650 milliGRAM(s) Oral every 6 hours  lidocaine   Patch 1 Patch Transdermal every 24 hours  metoprolol tartrate 25 milliGRAM(s) Oral <User Schedule>  metoprolol tartrate 50 milliGRAM(s) Oral at bedtime  rivaroxaban 20 milliGRAM(s) Oral with dinner    MEDICATIONS  (PRN):  diclofenac 50 milliGRAM(s) Oral three times a day PRN Severe Pain (7 - 10)      CAPILLARY BLOOD GLUCOSE        I&O's Summary    25 Apr 2021 07:01  -  26 Apr 2021 07:00  --------------------------------------------------------  IN: 0 mL / OUT: 0 mL / NET: 0 mL    26 Apr 2021 07:01  -  26 Apr 2021 13:54  --------------------------------------------------------  IN: 220 mL / OUT: 0 mL / NET: 220 mL        PHYSICAL EXAM:  Vital Signs Last 24 Hrs  T(C): 36.7 (26 Apr 2021 11:31), Max: 36.9 (26 Apr 2021 03:15)  T(F): 98.1 (26 Apr 2021 11:31), Max: 98.5 (26 Apr 2021 03:15)  HR: 61 (26 Apr 2021 11:31) (61 - 82)  BP: 126/87 (26 Apr 2021 11:31) (115/62 - 140/96)  BP(mean): --  RR: 18 (26 Apr 2021 11:31) (17 - 18)  SpO2: 98% (26 Apr 2021 11:31) (93% - 100%)    CONSTITUTIONAL: NAD, well-developed  RESPIRATORY: Normal respiratory effort; lungs are clear to auscultation bilaterally  CARDIOVASCULAR: Regular rate and rhythm, normal S1 and S2, no murmur/rub/gallop; No lower extremity edema; Peripheral pulses are 2+ bilaterally  ABDOMEN: Nontender to palpation, normoactive bowel sounds, no rebound/guarding; No hepatosplenomegaly  MUSCLOSKELETAL: no clubbing or cyanosis of digits; L knee tender to palpation in poseterior aspect of knee, no bakers cyst palpated.  No joint effuison, no pain in medial or lateral knee joint. decreased knee flexion 2/2 pain   PSYCH: A+O to person, place, and time; affect appropriate    LABS:                        12.4   12.63 )-----------( 273      ( 25 Apr 2021 15:04 )             38.3     04-25    140  |  104  |  8   ----------------------------<  93  4.1   |  22  |  0.63    Ca    9.1      25 Apr 2021 15:04    TPro  7.0  /  Alb  4.0  /  TBili  0.3  /  DBili  x   /  AST  16  /  ALT  39  /  AlkPhos  144<H>  04-25      CARDIAC MARKERS ( 25 Apr 2021 15:04 )  x     / x     / 66 U/L / x     / x                RADIOLOGY & ADDITIONAL TESTS:  Results Reviewed:   Imaging Personally Reviewed:  Electrocardiogram Personally Reviewed:    COORDINATION OF CARE:  Care Discussed with Consultants/Other Providers [Y/N]:  Prior or Outpatient Records Reviewed [Y/N]:

## 2021-04-27 NOTE — DISCHARGE NOTE NURSING/CASE MANAGEMENT/SOCIAL WORK - PATIENT PORTAL LINK FT
You can access the FollowMyHealth Patient Portal offered by [facility] by registering at the following website: [email or web address]. By joining [facility]’s FollowMyHealth portal, you will also be able to view your health information using other applications (apps) compatible with our system.

## 2021-04-27 NOTE — PROGRESS NOTE ADULT - PROBLEM SELECTOR PLAN 3
continue metoprolol
continue metoprolol    #Dispo:  d/c planning 36 minutes with follow up with rheum and ortho

## 2021-04-27 NOTE — PROGRESS NOTE ADULT - ASSESSMENT
50F with PMHx of HTN and chronic atrial fibrillation presents with one month history of left knee pain. Exam significant for poor ROM both actively and passively to the left knee secondary to pain with no joint laxity or swelling. Left knee XR showing mild left knee arthrosis. Patient being admitted for pain and difficulty with ambulation.
50F with PMHx of HTN and chronic atrial fibrillation presents with one month history of left knee pain. Exam significant for poor ROM both actively and passively to the left knee secondary to pain with no joint laxity or swelling. Left knee XR showing mild left knee arthrosis. Patient being admitted for pain and difficulty with ambulation.

## 2021-04-27 NOTE — PROGRESS NOTE ADULT - PROBLEM SELECTOR PLAN 2
-Continue with Xarelto  -Continue with Metoprolol 25 mg AM and 50 mg PM
-Continue with Xarelto  -Continue with Metoprolol 25 mg AM and 50 mg PM

## 2021-05-20 ENCOUNTER — APPOINTMENT (OUTPATIENT)
Dept: ORTHOPEDIC SURGERY | Facility: CLINIC | Age: 51
End: 2021-05-20
Payer: MEDICAID

## 2021-05-20 DIAGNOSIS — M25.562 PAIN IN LEFT KNEE: ICD-10-CM

## 2021-05-20 PROCEDURE — 99214 OFFICE O/P EST MOD 30 MIN: CPT

## 2021-05-26 PROBLEM — M25.562 LEFT KNEE PAIN: Status: ACTIVE | Noted: 2021-05-26

## 2021-05-26 NOTE — PHYSICAL EXAM
[de-identified] : Oriented to time, place, person\par Mood: Normal\par Affect: Normal\par Appearance: Healthy, well appearing, no acute distress.\par Gait: Normal\par Assistive Devices: None\par \par Left Knee Exam:\par \par Skin: Clean, dry, intact\par Inspection: No obvious malalignment, no masses, no swelling, no effusion\par Pulses: 2+ DP/PT pulses \par ROM: 0-135 degrees of flexion. +medial pain with deep knee flexion/extension.\par Tenderness: ++ MJLT. + LJLT. No pain over the patella facets. No pain to the quadriceps tendon. No pain to the patella tendon. No posterior knee tenderness.\par Stability: Stable.\par Strength: 5/5 Q/H/TA/GS/EHL, without atrophy\par Neuro: Intact to light touch throughout, DTRs normal\par Additional Tests: +Timo's test, Negative patellar grind test  [de-identified] : Images were reviewed from ER dated 4/25/2021.\par \par Multiple views of the left knee were obtained that show no acute fracture or dislocation. There is no medial, no lateral and no patellofemoral degenerative changes seen. There is no significant malalignment. No significant other obvious osseous abnormality, otherwise unremarkable.\par \par MRI of left tib/fib dated 4.27.21 shows possible medial meniscus tear.

## 2021-05-26 NOTE — HISTORY OF PRESENT ILLNESS
[de-identified] : 50 year old female presents today with left knee pain x 4 months. No injury reported. The pain is constant worse with walking. Localizes pain to the medial knee. Reports radiation of pain into lower extremity and left foot. C/O occasional swelling in the foot. Taking Tylenol for pain. She was seen at [facility] and x-ray were negative for fx. States that she had similar pain in the right knee and was treated by Dr. Aviles for OA was given cortisone injection in 2018 which as per patient was not helpful. Aspiration done by outside physician  in 2018 for right knee was helpful. \par \par The patient's past medical history, past surgical history, medications and allergies were reviewed by me today with the patient and documented accordingly. In addition, the patient's family and social history, which were noncontributory to this visit, were reviewed also.

## 2021-05-26 NOTE — ADDENDUM
[FreeTextEntry1] : This note was written by Megan Trivedi on 05/20/2021 acting solely as a scribe for Dr. Odilon Montoya.\par \par All medical record entries made by the Scribe were at my, Dr. Odilon Montoya, direction and personally dictated by me on 05/20/2021. I have personally reviewed the chart and agree that the record accurately reflects my personal performance of the history, physical exam, assessment and plan.

## 2021-05-26 NOTE — DISCUSSION/SUMMARY
[de-identified] : 51 y/o female with left knee pain. \par \par Patient presents for evaluation of left knee pain.  The patient has been seen in the emergency room, and has had an MRI of the tib-fib due to radiating symptoms.  However, symptoms appear to be mostly related through the medial joint consistent with possible meniscal pathology as seen on tib-fib imaging.given the patient's dysfunction, severe pain, recommendation is for additional MRI of the left knee.  Discussed with the patient and son in detail the concern for underlying internal derangement to the meniscus and possible treatment options that may include conservative management (e.g. RICE, activity modification, PT, injection therapy) versus operative arthroscopy. \par \par Discussed that treatment would likely depend on the nature of the injury, quality of the chondral surfaces (degree of arthrosis) as seen on MRI imaging.  \par \par Recommendation: Rest, ice, compression, elevation (RICE) and OTC NSAID's as instructed until MRI imaging.\par \par Follow up after MRI.

## 2021-11-03 RX ORDER — METOPROLOL TARTRATE 50 MG
1 TABLET ORAL
Qty: 0 | Refills: 0 | DISCHARGE

## 2021-11-03 RX ORDER — ASPIRIN/CALCIUM CARB/MAGNESIUM 324 MG
1 TABLET ORAL
Qty: 0 | Refills: 0 | DISCHARGE

## 2021-11-04 ENCOUNTER — APPOINTMENT (OUTPATIENT)
Dept: CARDIOLOGY | Facility: HOSPITAL | Age: 51
End: 2021-11-04

## 2021-11-04 ENCOUNTER — NON-APPOINTMENT (OUTPATIENT)
Age: 51
End: 2021-11-04

## 2021-11-04 VITALS
BODY MASS INDEX: 28.88 KG/M2 | RESPIRATION RATE: 16 BRPM | OXYGEN SATURATION: 98 % | WEIGHT: 195 LBS | HEIGHT: 69 IN | SYSTOLIC BLOOD PRESSURE: 137 MMHG | HEART RATE: 90 BPM | DIASTOLIC BLOOD PRESSURE: 95 MMHG

## 2021-11-04 DIAGNOSIS — I10 ESSENTIAL (PRIMARY) HYPERTENSION: ICD-10-CM

## 2021-11-04 DIAGNOSIS — I48.91 UNSPECIFIED ATRIAL FIBRILLATION: ICD-10-CM

## 2021-11-04 NOTE — ASSESSMENT
[FreeTextEntry1] : 50 yo with PMHx of paroxysmal Afib on xarelto and HTN who presents to the cardiology clinic as new patient after her prior cardiologist retired. No ROS findings. Good exercise tolerance. Last TTE 3 mo was reportedly normal. Patient will fax TTE record.\par \par #Paroxysmal Afib\par - EKG NSR\par - C/w metoprolol tartrate 50mg BID\par - Xarelto 20mg daily for systemic embolization prevention, does not report hx of CKD, will fax bloodwork from 10/21 at PCP office\par \par #HTN\par - BB as above\par \par #HLD\par - Would like to try lifestyle modification only \par \par F/u in 6 mo\par \par Keegan Ellison MD

## 2021-11-04 NOTE — HISTORY OF PRESENT ILLNESS
[FreeTextEntry1] : 50 yo with PMHx of paroxysmal Afib on xarelto and HTN who presents to the cardiology clinic as new patient after her prior cardiologist retired. No ROS findings. Good exercise tolerance. Last TTE 3 mo was reportedly normal. Patient will fax TTE record.

## 2021-11-05 PROBLEM — I48.91 UNSPECIFIED ATRIAL FIBRILLATION: Chronic | Status: ACTIVE | Noted: 2021-04-29

## 2021-11-05 PROBLEM — I10 ESSENTIAL (PRIMARY) HYPERTENSION: Chronic | Status: ACTIVE | Noted: 2021-04-29

## 2021-11-05 PROBLEM — I48.20 CHRONIC ATRIAL FIBRILLATION, UNSPECIFIED: Chronic | Status: ACTIVE | Noted: 2021-04-25

## 2021-11-05 PROBLEM — I10 ESSENTIAL (PRIMARY) HYPERTENSION: Chronic | Status: ACTIVE | Noted: 2021-04-25

## 2021-12-27 NOTE — ED ADULT TRIAGE NOTE - MEANS OF ARRIVAL
Patient should have one more refill left. [facility] on previous message.    wheelchair

## 2022-01-24 RX ORDER — DICLOFENAC SODIUM 10 MG/G
1 GEL TOPICAL DAILY
Qty: 1 | Refills: 2 | Status: DISCONTINUED | COMMUNITY
Start: 2018-07-26 | End: 2022-01-24

## 2022-01-24 RX ORDER — METHOTREXATE 2.5 MG/1
2.5 TABLET ORAL WEEKLY
Refills: 0 | Status: ACTIVE | COMMUNITY

## 2022-01-24 RX ORDER — DICLOFENAC SODIUM 75 MG/1
75 TABLET, DELAYED RELEASE ORAL
Qty: 30 | Refills: 0 | Status: DISCONTINUED | COMMUNITY
Start: 2018-06-07 | End: 2022-01-24

## 2022-01-24 RX ORDER — GABAPENTIN 100 MG/1
100 CAPSULE ORAL 3 TIMES DAILY
Qty: 90 | Refills: 0 | Status: DISCONTINUED | COMMUNITY
Start: 2018-07-26 | End: 2022-01-24

## 2022-01-24 RX ORDER — OXYCODONE AND ACETAMINOPHEN 5; 325 MG/1; MG/1
5-325 TABLET ORAL
Qty: 42 | Refills: 0 | Status: DISCONTINUED | COMMUNITY
Start: 2018-07-19 | End: 2022-01-24

## 2022-01-24 RX ORDER — HYDROXYCHLOROQUINE SULFATE 200 MG/1
200 TABLET ORAL TWICE DAILY
Refills: 0 | Status: ACTIVE | COMMUNITY

## 2022-01-24 RX ORDER — DICLOFENAC SODIUM 75 MG/1
75 TABLET, DELAYED RELEASE ORAL
Qty: 60 | Refills: 1 | Status: DISCONTINUED | COMMUNITY
Start: 2018-06-29 | End: 2022-01-24

## 2022-01-24 RX ORDER — GABAPENTIN 100 MG/1
100 CAPSULE ORAL 3 TIMES DAILY
Qty: 90 | Refills: 0 | Status: DISCONTINUED | COMMUNITY
Start: 2018-07-19 | End: 2022-01-24

## 2022-01-24 RX ORDER — TRAMADOL HYDROCHLORIDE 50 MG/1
50 TABLET, COATED ORAL
Qty: 90 | Refills: 0 | Status: DISCONTINUED | COMMUNITY
Start: 2018-06-29 | End: 2022-01-24

## 2022-02-02 ENCOUNTER — APPOINTMENT (OUTPATIENT)
Dept: CARDIOLOGY | Facility: CLINIC | Age: 52
End: 2022-02-02

## 2022-02-03 ENCOUNTER — APPOINTMENT (OUTPATIENT)
Dept: CARDIOLOGY | Facility: HOSPITAL | Age: 52
End: 2022-02-03

## 2022-02-03 VITALS
HEART RATE: 75 BPM | OXYGEN SATURATION: 99 % | DIASTOLIC BLOOD PRESSURE: 85 MMHG | RESPIRATION RATE: 16 BRPM | SYSTOLIC BLOOD PRESSURE: 138 MMHG

## 2022-02-03 DIAGNOSIS — R06.02 SHORTNESS OF BREATH: ICD-10-CM

## 2022-02-03 NOTE — HISTORY OF PRESENT ILLNESS
[FreeTextEntry1] : 50 yo with PMHx of paroxysmal Afib on xarelto and HTN who presents to the cardiology clinic for follow up. ROS + for new dyspnea on exertion x 6 weeks. Baseline exercise tolerance was 3 flight of stairs and up to 1 mile but now gets dyspneic with 1-2 flight of stairs and with carrying groceries. No other ROS findings. \par \par Last TTE Nov/2020: normal EF\par Last Nuclear exercise stress test Nov/2020: 10 METs, no perfusion defect, normal EF

## 2022-02-03 NOTE — DISCUSSION/SUMMARY
[FreeTextEntry1] : 50 yo with PMHx of paroxysmal Afib on xarelto and HTN who presents for f/u, with new dyspnea on exertion.\par \par #Dyspnea on exertion\par - Will get CBC, CMP, lipid panel, A1c\par - TTE\par - Exercise stress test\par - CXR\par \par #Paroxysmal Afib\par - C/w metoprolol tartrate 50mg BID\par - Xarelto 20mg daily for systemic embolization prevention\par \par #HTN\par - BB as above\par \par #HLD\par - Would like to try lifestyle modification only, will check lipid panel\par \par F/u in 1 mo\par \par Keegan Ellison MD

## 2022-02-08 ENCOUNTER — APPOINTMENT (OUTPATIENT)
Dept: CV DIAGNOSITCS | Facility: HOSPITAL | Age: 52
End: 2022-02-08

## 2022-02-14 ENCOUNTER — APPOINTMENT (OUTPATIENT)
Dept: CV DIAGNOSTICS | Facility: HOSPITAL | Age: 52
End: 2022-02-14

## 2022-03-17 ENCOUNTER — APPOINTMENT (OUTPATIENT)
Dept: CARDIOLOGY | Facility: HOSPITAL | Age: 52
End: 2022-03-17

## 2022-03-17 VITALS
WEIGHT: 195 LBS | OXYGEN SATURATION: 97 % | SYSTOLIC BLOOD PRESSURE: 116 MMHG | BODY MASS INDEX: 28.88 KG/M2 | RESPIRATION RATE: 16 BRPM | HEIGHT: 69 IN | DIASTOLIC BLOOD PRESSURE: 84 MMHG | HEART RATE: 92 BPM

## 2022-03-17 NOTE — DISCUSSION/SUMMARY
[FreeTextEntry1] : 52 yo with PMHx of paroxysmal Afib on xarelto and HTN who presents for f/u\par \par #Paroxysmal Afib\par - C/w metoprolol tartrate 50mg BID\par - Xarelto 20mg daily for systemic embolization prevention\par \par #HTN\par - BB as above\par \par Recommended daily exercise\par \par F/u in 6 mo\par \par Keegan Ellison MD

## 2022-03-17 NOTE — HISTORY OF PRESENT ILLNESS
[FreeTextEntry1] : 50 yo with PMHx of paroxysmal Afib on xarelto and HTN who presents to the cardiology clinic for follow up. Has had dyspnea on exertion so was sent for TTE and exercise stress test.\par \par TTE Feb/2022: normal EF, G1DD\par Exercise stress test: 4 METs, no EKG changes, HTN response and excess HR increase, duke score 5\par \par No significant dyspnea on exertion any more and no anginal symptoms at rest or with exertion. No palpitations.

## 2022-07-11 ENCOUNTER — OUTPATIENT (OUTPATIENT)
Dept: OUTPATIENT SERVICES | Facility: HOSPITAL | Age: 52
LOS: 1 days | Discharge: HOME | End: 2022-07-11

## 2022-07-11 ENCOUNTER — APPOINTMENT (OUTPATIENT)
Dept: OTOLARYNGOLOGY | Facility: CLINIC | Age: 52
End: 2022-07-11

## 2022-07-11 VITALS
SYSTOLIC BLOOD PRESSURE: 134 MMHG | HEIGHT: 69 IN | HEART RATE: 84 BPM | BODY MASS INDEX: 29.18 KG/M2 | DIASTOLIC BLOOD PRESSURE: 92 MMHG | WEIGHT: 197 LBS

## 2022-07-11 DIAGNOSIS — Z90.710 ACQUIRED ABSENCE OF BOTH CERVIX AND UTERUS: Chronic | ICD-10-CM

## 2022-07-11 PROCEDURE — 99203 OFFICE O/P NEW LOW 30 MIN: CPT

## 2022-07-11 RX ORDER — MONTELUKAST 10 MG/1
10 TABLET, FILM COATED ORAL DAILY
Qty: 30 | Refills: 1 | Status: ACTIVE | COMMUNITY
Start: 2022-07-11 | End: 1900-01-01

## 2022-07-11 RX ORDER — FLUTICASONE PROPIONATE 50 UG/1
50 SPRAY, METERED NASAL
Qty: 1 | Refills: 2 | Status: ACTIVE | COMMUNITY
Start: 2022-07-11 | End: 1900-01-01

## 2022-07-11 NOTE — PHYSICAL EXAM
[de-identified] : MILD CONGESTION [Normal] : mucosa is normal [Midline] : trachea located in midline position

## 2022-07-11 NOTE — HISTORY OF PRESENT ILLNESS
[de-identified] : RECURRENT NASAL CONGESTION FOR MORE THAN 5 YEARS\par NO FEVER\par MEDICAL HX REVIEWED

## 2022-07-11 NOTE — REVIEW OF SYSTEMS
[Nasal Congestion] : nasal congestion [Nose Bleeds] : nose bleeds [Problem Snoring] : problem snoring [Sinus Pain] : sinus pain [Sinus Pressure] : sinus pressure [Hoarseness] : hoarseness [Throat Clearing] : throat clearing [Throat Pain] : throat pain [Throat Dryness] : throat dryness [Joint Pain] : joint pain [Negative] : Heme/Lymph [Patient Intake Form Reviewed] : Patient intake form was reviewed

## 2022-07-11 NOTE — ASSESSMENT
[FreeTextEntry1] : RHINITS LIKELY ALLERGY RELATED\par CT SINUS\par FLONASE\par SINGULAIR 10\par F/U AFTER ABOVE

## 2022-07-13 DIAGNOSIS — J31.0 CHRONIC RHINITIS: ICD-10-CM

## 2022-07-15 ENCOUNTER — APPOINTMENT (OUTPATIENT)
Dept: CT IMAGING | Facility: CLINIC | Age: 52
End: 2022-07-15

## 2022-08-01 ENCOUNTER — APPOINTMENT (OUTPATIENT)
Dept: OTOLARYNGOLOGY | Facility: CLINIC | Age: 52
End: 2022-08-01

## 2022-08-01 VITALS
HEIGHT: 68 IN | SYSTOLIC BLOOD PRESSURE: 130 MMHG | DIASTOLIC BLOOD PRESSURE: 90 MMHG | HEART RATE: 77 BPM | WEIGHT: 197 LBS | BODY MASS INDEX: 29.86 KG/M2

## 2022-08-01 DIAGNOSIS — J32.9 CHRONIC SINUSITIS, UNSPECIFIED: ICD-10-CM

## 2022-08-01 PROCEDURE — 99213 OFFICE O/P EST LOW 20 MIN: CPT

## 2022-08-01 RX ORDER — CETIRIZINE HYDROCHLORIDE 10 MG/1
10 TABLET, COATED ORAL
Qty: 30 | Refills: 1 | Status: ACTIVE | COMMUNITY
Start: 2022-08-01 | End: 1900-01-01

## 2022-08-01 RX ORDER — FLUTICASONE PROPIONATE 50 UG/1
50 SPRAY, METERED NASAL DAILY
Qty: 1 | Refills: 2 | Status: ACTIVE | COMMUNITY
Start: 2022-08-01 | End: 1900-01-01

## 2022-08-01 RX ORDER — AZELASTINE HYDROCHLORIDE 137 UG/1
0.1 SPRAY, METERED NASAL TWICE DAILY
Qty: 1 | Refills: 3 | Status: ACTIVE | COMMUNITY
Start: 2022-08-01 | End: 1900-01-01

## 2022-08-01 NOTE — PHYSICAL EXAM
[de-identified] :  CONGESTION [Normal] : mucosa is normal [Midline] : trachea located in midline position

## 2022-08-01 NOTE — HISTORY OF PRESENT ILLNESS
[de-identified] : 51 year old female presents for follow-up for sinus infection. \par Reports CT Scan was denied by insurance company.\par Reports that three days she went to Urgent Care and was given Amoxicillin--taking 2x daily.\par States nasal congestion, sinus pain/pressure, post nasal drip without phlegm production, clear nasal discharge. \par Using Flonase and Montelukast daily.\par Report throat feels very dry. \par Patient denies dysphagia, odynophagia, dyspnea, dysphonia, otalgia, recent fevers or infections.

## 2022-08-03 DIAGNOSIS — J32.9 CHRONIC SINUSITIS, UNSPECIFIED: ICD-10-CM

## 2022-08-04 ENCOUNTER — APPOINTMENT (OUTPATIENT)
Dept: CARDIOLOGY | Facility: HOSPITAL | Age: 52
End: 2022-08-04

## 2022-08-12 ENCOUNTER — APPOINTMENT (OUTPATIENT)
Dept: MAMMOGRAPHY | Facility: IMAGING CENTER | Age: 52
End: 2022-08-12

## 2022-08-12 ENCOUNTER — APPOINTMENT (OUTPATIENT)
Dept: CT IMAGING | Facility: CLINIC | Age: 52
End: 2022-08-12

## 2022-08-12 ENCOUNTER — OUTPATIENT (OUTPATIENT)
Dept: OUTPATIENT SERVICES | Facility: HOSPITAL | Age: 52
LOS: 1 days | End: 2022-08-12
Payer: MEDICAID

## 2022-08-12 DIAGNOSIS — Z00.8 ENCOUNTER FOR OTHER GENERAL EXAMINATION: ICD-10-CM

## 2022-08-12 DIAGNOSIS — Z90.710 ACQUIRED ABSENCE OF BOTH CERVIX AND UTERUS: Chronic | ICD-10-CM

## 2022-08-12 DIAGNOSIS — J31.0 CHRONIC RHINITIS: ICD-10-CM

## 2022-08-12 PROCEDURE — 77067 SCR MAMMO BI INCL CAD: CPT | Mod: 26

## 2022-08-12 PROCEDURE — 77067 SCR MAMMO BI INCL CAD: CPT

## 2022-08-12 PROCEDURE — 77063 BREAST TOMOSYNTHESIS BI: CPT | Mod: 26

## 2022-08-12 PROCEDURE — 70486 CT MAXILLOFACIAL W/O DYE: CPT

## 2022-08-12 PROCEDURE — 70486 CT MAXILLOFACIAL W/O DYE: CPT | Mod: 26

## 2022-08-12 PROCEDURE — 77063 BREAST TOMOSYNTHESIS BI: CPT

## 2022-08-18 ENCOUNTER — OUTPATIENT (OUTPATIENT)
Dept: OUTPATIENT SERVICES | Facility: HOSPITAL | Age: 52
LOS: 1 days | End: 2022-08-18
Payer: MEDICAID

## 2022-08-18 ENCOUNTER — APPOINTMENT (OUTPATIENT)
Dept: MAMMOGRAPHY | Facility: IMAGING CENTER | Age: 52
End: 2022-08-18

## 2022-08-18 DIAGNOSIS — Z00.8 ENCOUNTER FOR OTHER GENERAL EXAMINATION: ICD-10-CM

## 2022-08-18 DIAGNOSIS — Z90.710 ACQUIRED ABSENCE OF BOTH CERVIX AND UTERUS: Chronic | ICD-10-CM

## 2022-08-18 PROCEDURE — G0279: CPT | Mod: 26

## 2022-08-18 PROCEDURE — 77065 DX MAMMO INCL CAD UNI: CPT | Mod: 26,RT

## 2022-08-18 PROCEDURE — 77065 DX MAMMO INCL CAD UNI: CPT

## 2022-08-18 PROCEDURE — G0279: CPT

## 2022-08-23 ENCOUNTER — APPOINTMENT (OUTPATIENT)
Dept: MAMMOGRAPHY | Facility: IMAGING CENTER | Age: 52
End: 2022-08-23

## 2022-08-23 ENCOUNTER — OUTPATIENT (OUTPATIENT)
Dept: OUTPATIENT SERVICES | Facility: HOSPITAL | Age: 52
LOS: 1 days | End: 2022-08-23
Payer: MEDICAID

## 2022-08-23 DIAGNOSIS — Z90.710 ACQUIRED ABSENCE OF BOTH CERVIX AND UTERUS: Chronic | ICD-10-CM

## 2022-08-23 DIAGNOSIS — Z00.8 ENCOUNTER FOR OTHER GENERAL EXAMINATION: ICD-10-CM

## 2022-08-23 PROCEDURE — 19081 BX BREAST 1ST LESION STRTCTC: CPT | Mod: RT

## 2022-08-23 PROCEDURE — 88305 TISSUE EXAM BY PATHOLOGIST: CPT | Mod: 26

## 2022-08-23 PROCEDURE — 77065 DX MAMMO INCL CAD UNI: CPT | Mod: 26,RT

## 2022-08-23 PROCEDURE — 88305 TISSUE EXAM BY PATHOLOGIST: CPT

## 2022-08-23 PROCEDURE — 19081 BX BREAST 1ST LESION STRTCTC: CPT

## 2022-08-23 PROCEDURE — A4648: CPT

## 2022-08-23 PROCEDURE — 77065 DX MAMMO INCL CAD UNI: CPT

## 2022-08-25 ENCOUNTER — APPOINTMENT (OUTPATIENT)
Dept: CARDIOLOGY | Facility: HOSPITAL | Age: 52
End: 2022-08-25

## 2022-08-25 ENCOUNTER — NON-APPOINTMENT (OUTPATIENT)
Age: 52
End: 2022-08-25

## 2022-08-25 VITALS
HEART RATE: 78 BPM | WEIGHT: 197 LBS | BODY MASS INDEX: 29.95 KG/M2 | TEMPERATURE: 98.3 F | DIASTOLIC BLOOD PRESSURE: 90 MMHG | OXYGEN SATURATION: 99 % | SYSTOLIC BLOOD PRESSURE: 126 MMHG | RESPIRATION RATE: 16 BRPM

## 2022-08-25 LAB — SURGICAL PATHOLOGY STUDY: SIGNIFICANT CHANGE UP

## 2022-08-25 NOTE — REVIEW OF SYSTEMS
Strep Throat: Care Instructions  Your Care Instructions    Strep throat is a bacterial infection that causes sudden, severe sore throat and fever. Strep throat, which is caused by bacteria called streptococcus, is treated with antibiotics. Sometimes a strep test is necessary to tell if the sore throat is caused by strep bacteria. Treatment can help ease symptoms and may prevent future problems. Follow-up care is a key part of your treatment and safety. Be sure to make and go to all appointments, and call your doctor if you are having problems. It's also a good idea to know your test results and keep a list of the medicines you take. How can you care for yourself at home? · Take your antibiotics as directed. Do not stop taking them just because you feel better. You need to take the full course of antibiotics. · Strep throat can spread to others until 24 hours after you begin taking antibiotics. During this time, you should avoid contact with other people at work or home, especially infants and children. Do not sneeze or cough on others, and wash your hands often. Keep your drinking glass and eating utensils separate from those of others, and wash these items well in hot, soapy water. · Gargle with warm salt water at least once each hour to help reduce swelling and make your throat feel better. Use 1 teaspoon of salt mixed in 8 fluid ounces of warm water. · Take an over-the-counter pain medication, such as acetaminophen (Tylenol), ibuprofen (Advil, Motrin), or naproxen (Aleve). Read and follow all instructions on the label. · Try an over-the-counter anesthetic throat spray or throat lozenges, which may help relieve throat pain. · Drink plenty of fluids. Fluids may help soothe an irritated throat. Hot fluids, such as tea or soup, may help your throat feel better. · Eat soft solids and drink plenty of clear liquids.  Flavored ice pops, ice cream, scrambled eggs, sherbet, and gelatin dessert (such as Jell-O) may also soothe the throat. · Get lots of rest.  · Do not smoke, and avoid secondhand smoke. If you need help quitting, talk to your doctor about stop-smoking programs and medicines. These can increase your chances of quitting for good. · Use a vaporizer or humidifier to add moisture to the air in your bedroom. Follow the directions for cleaning the machine. When should you call for help? Call your doctor now or seek immediate medical care if:  · You have a new or higher fever. · You have a fever with a stiff neck or severe headache. · You have new or worse trouble swallowing. · Your sore throat gets much worse on one side. · Your pain becomes much worse on one side of your throat. Watch closely for changes in your health, and be sure to contact your doctor if:  · You are not getting better after 2 days (48 hours). · You do not get better as expected. Where can you learn more? Go to http://kerrie-bob.info/. Enter K625 in the search box to learn more about \"Strep Throat: Care Instructions. \"  Current as of: July 29, 2016  Content Version: 11.1  © 4493-2783 [employer]. Care instructions adapted under license by [employer] (which disclaims liability or warranty for this information). If you have questions about a medical condition or this instruction, always ask your healthcare professional. [address] any warranty or liability for your use of this information. Upper Respiratory Infection (Cold): Care Instructions  Your Care Instructions    An upper respiratory infection, or URI, is an infection of the nose, sinuses, or throat. URIs are spread by coughs, sneezes, and direct contact. The common cold is the most frequent kind of URI. The flu and sinus infections are other kinds of URIs. Almost all URIs are caused by viruses. Antibiotics won't cure them. But you can treat most infections with home care.  This may include drinking lots of fluids and taking over-the-counter pain medicine. You will probably feel better in 4 to 10 days. The doctor has checked you carefully, but problems can develop later. If you notice any problems or new symptoms, get medical treatment right away. Follow-up care is a key part of your treatment and safety. Be sure to make and go to all appointments, and call your doctor if you are having problems. It's also a good idea to know your test results and keep a list of the medicines you take. How can you care for yourself at home? · To prevent dehydration, drink plenty of fluids, enough so that your urine is light yellow or clear like water. Choose water and other caffeine-free clear liquids until you feel better. If you have kidney, heart, or liver disease and have to limit fluids, talk with your doctor before you increase the amount of fluids you drink. · Take an over-the-counter pain medicine, such as acetaminophen (Tylenol), ibuprofen (Advil, Motrin), or naproxen (Aleve). Read and follow all instructions on the label. · Before you use cough and cold medicines, check the label. These medicines may not be safe for young children or for people with certain health problems. · Be careful when taking over-the-counter cold or flu medicines and Tylenol at the same time. Many of these medicines have acetaminophen, which is Tylenol. Read the labels to make sure that you are not taking more than the recommended dose. Too much acetaminophen (Tylenol) can be harmful. · Get plenty of rest.  · Do not smoke or allow others to smoke around you. If you need help quitting, talk to your doctor about stop-smoking programs and medicines. These can increase your chances of quitting for good. When should you call for help? Call 911 anytime you think you may need emergency care. For example, call if:  · You have severe trouble breathing.   Call your doctor now or seek immediate medical care if:  · You seem to be getting much sicker. · You have new or worse trouble breathing. · You have a new or higher fever. · You have a new rash. Watch closely for changes in your health, and be sure to contact your doctor if:  · You have a new symptom, such as a sore throat, an earache, or sinus pain. · You cough more deeply or more often, especially if you notice more mucus or a change in the color of your mucus. · You do not get better as expected. Where can you learn more? Go to http://kerrie-bob.info/. Enter S410 in the search box to learn more about \"Upper Respiratory Infection (Cold): Care Instructions. \"  Current as of: June 30, 2016  Content Version: 11.1  © 9907-0979 "[employer]. Care instructions adapted under license by [employer] (which disclaims liability or warranty for this information). If you have questions about a medical condition or this instruction, always ask your healthcare professional. [place] [address] any warranty or liability for your use of this information. [Dyspnea on exertion] : dyspnea during exertion [Negative] : Heme/Lymph

## 2022-08-25 NOTE — HISTORY OF PRESENT ILLNESS
[FreeTextEntry1] : 52 yo with PMHx of paroxysmal Afib on xarelto and HTN who presents to the cardiology clinic for follow up. Has had dyspnea on exertion so was sent for TTE and exercise stress test.\par \par TTE Feb/2022: normal EF, G1DD\par Exercise stress test: 4 METs, no EKG changes, HTN response and excess HR increase, duke score 5\par \par No significant dyspnea on exertion any more and no anginal symptoms at rest or with exertion. No palpitations.

## 2022-08-25 NOTE — DISCUSSION/SUMMARY
[FreeTextEntry1] : 50 yo with PMHx of paroxysmal Afib on xarelto and HTN who presents for f/u\par \par #Paroxysmal Afib\par - C/w metoprolol tartrate 50mg BID\par - Xarelto 20mg daily for systemic embolization prevention\par \par #HTN\par - BB as above\par \par Recommended daily exercise\par \par F/u in 6 mo\par \par Keegan Ellison MD

## 2022-08-30 ENCOUNTER — NON-APPOINTMENT (OUTPATIENT)
Age: 52
End: 2022-08-30

## 2022-08-30 RX ORDER — RIVAROXABAN 20 MG/1
20 TABLET, FILM COATED ORAL
Qty: 30 | Refills: 3 | Status: ACTIVE | COMMUNITY
Start: 2021-11-04 | End: 1900-01-01

## 2022-09-01 RX ORDER — FOLIC ACID 1 MG/1
1 TABLET ORAL
Qty: 30 | Refills: 0 | Status: ACTIVE | COMMUNITY

## 2022-09-01 RX ORDER — ATORVASTATIN CALCIUM 10 MG/1
10 TABLET, FILM COATED ORAL
Qty: 90 | Refills: 3 | Status: DISCONTINUED | COMMUNITY
End: 2022-09-01

## 2022-09-07 PROBLEM — Z80.3 FAMILY HISTORY OF MALIGNANT NEOPLASM OF BREAST: Status: ACTIVE | Noted: 2022-09-07

## 2022-09-12 ENCOUNTER — RESULT REVIEW (OUTPATIENT)
Age: 52
End: 2022-09-12

## 2022-09-12 ENCOUNTER — APPOINTMENT (OUTPATIENT)
Dept: SURGICAL ONCOLOGY | Facility: CLINIC | Age: 52
End: 2022-09-12

## 2022-09-12 VITALS
RESPIRATION RATE: 16 BRPM | SYSTOLIC BLOOD PRESSURE: 124 MMHG | HEART RATE: 76 BPM | HEIGHT: 68 IN | DIASTOLIC BLOOD PRESSURE: 81 MMHG | OXYGEN SATURATION: 97 % | BODY MASS INDEX: 29.86 KG/M2 | TEMPERATURE: 97.7 F | WEIGHT: 197 LBS

## 2022-09-12 DIAGNOSIS — Z78.9 OTHER SPECIFIED HEALTH STATUS: ICD-10-CM

## 2022-09-12 DIAGNOSIS — Z80.3 FAMILY HISTORY OF MALIGNANT NEOPLASM OF BREAST: ICD-10-CM

## 2022-09-12 PROCEDURE — 99244 OFF/OP CNSLTJ NEW/EST MOD 40: CPT

## 2022-09-12 PROCEDURE — T1013A: CUSTOM

## 2022-09-12 RX ORDER — ATORVASTATIN CALCIUM 10 MG/1
10 TABLET, FILM COATED ORAL
Qty: 90 | Refills: 3 | Status: DISCONTINUED | COMMUNITY
Start: 2022-09-01 | End: 2022-09-12

## 2022-09-12 NOTE — PHYSICAL EXAM
[Normocephalic] : normocephalic [Atraumatic] : atraumatic [EOMI] : extra ocular movement intact [PERRL] : pupils equal, round and reactive to light [Sclera nonicteric] : sclera nonicteric [Supple] : supple [No Supraclavicular Adenopathy] : no supraclavicular adenopathy [No Cervical Adenopathy] : no cervical adenopathy [No Thyromegaly] : no thyromegaly [Examined in the supine and seated position] : examined in the supine and seated position [Symmetrical] : symmetrical [Bra Size: ___] : Bra Size: [unfilled] [Grade 2] : Ptosis Grade 2 [No dominant masses] : no dominant masses in right breast  [No dominant masses] : no dominant masses left breast [No Nipple Retraction] : no left nipple retraction [No Nipple Discharge] : no left nipple discharge [Breast Mass Right Breast ___cm] : no masses [Breast Mass Left Breast ___cm] : no masses [Breast Nipple Inversion] : nipples not inverted [Breast Nipple Retraction] : nipples not retracted [Breast Nipple Flattening] : nipples not flattened [Breast Nipple Fissures] : nipples not fissured [Breast Abnormal Lactation (Galactorrhea)] : no galactorrhea [Breast Abnormal Secretion Bloody Fluid] : no bloody discharge [Breast Abnormal Secretion Serous Fluid] : no serous discharge [Breast Abnormal Secretion Opalescent Fluid] : no milky discharge [No Axillary Lymphadenopathy] : no left axillary lymphadenopathy [No Edema] : no edema [No Rashes] : no rashes [No Ulceration] : no ulceration [de-identified] : non-labored respirations  [de-identified] : Very dense breasts [de-identified] : bruising upper inner breast (relating to a fall)

## 2022-09-12 NOTE — ASSESSMENT
[FreeTextEntry1] : The patient is a 51 year F referred by HERNÁN Montes for consultation regarding Right breast ADH, here for initial visit. \par \par We discussed the implications of atypical hyperplasia at length in the office today, first discussing the increased breast cancer risk, which results in a 2-3 fold increase lifelong and implications it has for her in terms of high-risk screening. Patients should undergo yearly imaging and clinical exam, and consideration can be given to the additional use of breast MRI screening. The patient should also meet with a Medical Oncologist to consider the risks and benefits to taking oral breast cancer- risk-reducing medications.\par \par We discussed that upon surgical excision 15-20% of cases are upgraded on excision, and thus excision is currently recommended.\par  \par We discussed that atypical hyperplasia is not a pre-cancerous or cancerous condition, but is an indication for breast cancer risk.\par \par Preoperative, intraoperative, and postoperative considerations were reviewed.  Risks, benefits, and alternatives to proposed surgical procedure were reviewed and all questions were answered to her satisfaction. \par \par Plan:\par - R excisional biopsy w/ MagSeed Localization (x1 site)\par - Pt prefers not to get an MRI due to claustrophobia, will attempt contrast-enhanced mammogram\par - Medical clearance, Xarelto plan

## 2022-09-12 NOTE — CONSULT LETTER
[Dear  ___] : Dear ~LAQUITA, [Consult Letter:] : I had the pleasure of evaluating your patient, [unfilled]. [Please see my note below.] : Please see my note below. [Consult Closing:] : Thank you very much for allowing me to participate in the care of this patient.  If you have any questions, please do not hesitate to contact me. [Sincerely,] : Sincerely, [FreeTextEntry3] : Mey Reyes MD\par Breast Surgeon\par Division of Surgical Oncology\par Department of Surgery\par [address] [place] [address]\[place] [address] \par Tel: [phone]\par Fax: [phone]\par Email: [email or web address]

## 2022-09-12 NOTE — PAST MEDICAL HISTORY
[Surgical Menopause] : The patient is in surgical menopause [Menarche Age ____] : age at menarche was [unfilled] [Menopause Age____] : age at menopause was [unfilled] [History of Hormone Replacement Treatment] : has no history of hormone replacement treatment [Total Preg ___] : G[unfilled] [Live Births ___] : P[unfilled]  [Age At Live Birth ___] : Age at live birth: [unfilled] [FreeTextEntry6] : age 22 in [place]--pt unsure of exact nature of fertility treatment [FreeTextEntry7] : none [FreeTextEntry8] : 5-6 months

## 2022-09-12 NOTE — REASON FOR VISIT
[Consultation] : a consultation visit [[employer] ] : provided by [employer]   [Time Spent: ____ minutes] : Total time spent using  services: [unfilled] minutes. The patient's primary language is not English thus required  services. [Interpreters_IDNumber] : [id number] [TWNoteComboBox1] : Prashant

## 2022-09-12 NOTE — HISTORY OF PRESENT ILLNESS
[FreeTextEntry1] : The patient is a 51 year F referred by Thalia JIM for consultation regarding ADH, here for initial visit. \par \par The patient reports that she has not had regular breast screening. Her last mammogram was in 2016, reportedly normal. She was encouraged to go for screening this year.\par \par Most recent  imaging:\par 8/12/2022 B/L SM (NW) TC 24.2% revealed heterogeneously dense breasts \par - L negative\par - R central to lateral N7: 5 mm cluster of calcs -> R DM\par - BR0, given the patient's history, she may meet American Cancer Society guidelines for annual screening with breast MRI in addition to annual mmg\par \par 08/18/2022 R DM (NW)\par - R small group of punctate calcs in a linear distribution -> R Stereo\par - BR4A\par \par 8/23/2022 R Stereo (NW)\par - R lower central (tophat): ADH, secretory change, calcs present in secretions and benign tissue.\par - Concordant\par \par She reports history of HTN, Afib, arthritis.\par She previously had a hysterectomy in 2007 for a "cyst."\par Her medications include Xarelto and metoprolol. She takes Plaquenil and methotrexate as needed.\par \par Patient has a family history of breast cancer: mother, age 45.\par

## 2022-09-16 ENCOUNTER — OUTPATIENT (OUTPATIENT)
Dept: OUTPATIENT SERVICES | Facility: HOSPITAL | Age: 52
LOS: 1 days | End: 2022-09-16
Payer: MEDICAID

## 2022-09-16 ENCOUNTER — APPOINTMENT (OUTPATIENT)
Dept: MAMMOGRAPHY | Facility: IMAGING CENTER | Age: 52
End: 2022-09-16

## 2022-09-16 DIAGNOSIS — Z00.8 ENCOUNTER FOR OTHER GENERAL EXAMINATION: ICD-10-CM

## 2022-09-16 DIAGNOSIS — Z90.710 ACQUIRED ABSENCE OF BOTH CERVIX AND UTERUS: Chronic | ICD-10-CM

## 2022-09-16 PROCEDURE — 77063 BREAST TOMOSYNTHESIS BI: CPT

## 2022-09-16 PROCEDURE — 77067 SCR MAMMO BI INCL CAD: CPT

## 2022-09-16 PROCEDURE — 77067 SCR MAMMO BI INCL CAD: CPT | Mod: 26

## 2022-09-16 PROCEDURE — 77063 BREAST TOMOSYNTHESIS BI: CPT | Mod: 26

## 2022-09-16 PROCEDURE — 82565 ASSAY OF CREATININE: CPT

## 2022-09-26 ENCOUNTER — OUTPATIENT (OUTPATIENT)
Dept: OUTPATIENT SERVICES | Facility: HOSPITAL | Age: 52
LOS: 1 days | End: 2022-09-26

## 2022-09-26 VITALS
HEART RATE: 61 BPM | SYSTOLIC BLOOD PRESSURE: 144 MMHG | HEIGHT: 66 IN | RESPIRATION RATE: 16 BRPM | WEIGHT: 194.01 LBS | TEMPERATURE: 98 F | DIASTOLIC BLOOD PRESSURE: 100 MMHG | OXYGEN SATURATION: 99 %

## 2022-09-26 DIAGNOSIS — Z90.710 ACQUIRED ABSENCE OF BOTH CERVIX AND UTERUS: Chronic | ICD-10-CM

## 2022-09-26 DIAGNOSIS — I10 ESSENTIAL (PRIMARY) HYPERTENSION: ICD-10-CM

## 2022-09-26 DIAGNOSIS — N60.99 UNSPECIFIED BENIGN MAMMARY DYSPLASIA OF UNSPECIFIED BREAST: ICD-10-CM

## 2022-09-26 DIAGNOSIS — N60.89 OTHER BENIGN MAMMARY DYSPLASIAS OF UNSPECIFIED BREAST: ICD-10-CM

## 2022-09-26 DIAGNOSIS — I48.91 UNSPECIFIED ATRIAL FIBRILLATION: ICD-10-CM

## 2022-09-26 DIAGNOSIS — Z01.812 ENCOUNTER FOR PREPROCEDURAL LABORATORY EXAMINATION: ICD-10-CM

## 2022-09-26 LAB
ANION GAP SERPL CALC-SCNC: 13 MMOL/L — SIGNIFICANT CHANGE UP (ref 7–14)
BUN SERPL-MCNC: 10 MG/DL — SIGNIFICANT CHANGE UP (ref 7–23)
CALCIUM SERPL-MCNC: 9.7 MG/DL — SIGNIFICANT CHANGE UP (ref 8.4–10.5)
CHLORIDE SERPL-SCNC: 103 MMOL/L — SIGNIFICANT CHANGE UP (ref 98–107)
CO2 SERPL-SCNC: 24 MMOL/L — SIGNIFICANT CHANGE UP (ref 22–31)
CREAT SERPL-MCNC: 0.62 MG/DL — SIGNIFICANT CHANGE UP (ref 0.5–1.3)
EGFR: 106 ML/MIN/1.73M2 — SIGNIFICANT CHANGE UP
GLUCOSE SERPL-MCNC: 85 MG/DL — SIGNIFICANT CHANGE UP (ref 70–99)
HCT VFR BLD CALC: 40.2 % — SIGNIFICANT CHANGE UP (ref 34.5–45)
HGB BLD-MCNC: 13.3 G/DL — SIGNIFICANT CHANGE UP (ref 11.5–15.5)
MCHC RBC-ENTMCNC: 29.5 PG — SIGNIFICANT CHANGE UP (ref 27–34)
MCHC RBC-ENTMCNC: 33.1 GM/DL — SIGNIFICANT CHANGE UP (ref 32–36)
MCV RBC AUTO: 89.1 FL — SIGNIFICANT CHANGE UP (ref 80–100)
NRBC # BLD: 0 /100 WBCS — SIGNIFICANT CHANGE UP (ref 0–0)
NRBC # FLD: 0 K/UL — SIGNIFICANT CHANGE UP (ref 0–0)
PLATELET # BLD AUTO: 274 K/UL — SIGNIFICANT CHANGE UP (ref 150–400)
POTASSIUM SERPL-MCNC: 4.3 MMOL/L — SIGNIFICANT CHANGE UP (ref 3.5–5.3)
POTASSIUM SERPL-SCNC: 4.3 MMOL/L — SIGNIFICANT CHANGE UP (ref 3.5–5.3)
RBC # BLD: 4.51 M/UL — SIGNIFICANT CHANGE UP (ref 3.8–5.2)
RBC # FLD: 13.2 % — SIGNIFICANT CHANGE UP (ref 10.3–14.5)
SODIUM SERPL-SCNC: 140 MMOL/L — SIGNIFICANT CHANGE UP (ref 135–145)
WBC # BLD: 6.99 K/UL — SIGNIFICANT CHANGE UP (ref 3.8–10.5)
WBC # FLD AUTO: 6.99 K/UL — SIGNIFICANT CHANGE UP (ref 3.8–10.5)

## 2022-09-26 NOTE — H&P PST ADULT - NSICDXPASTMEDICALHX_GEN_ALL_CORE_FT
PAST MEDICAL HISTORY:  Afib     Chronic atrial fibrillation     HTN (hypertension)     Hypertension     Migraine

## 2022-09-26 NOTE — H&P PST ADULT - PROBLEM SELECTOR PLAN 2
Pt instructed to take metoprolol on morning of surgery.   B/p elevated.  Pt denies symptomatology  Requested cardiology eval

## 2022-09-26 NOTE — H&P PST ADULT - PROBLEM SELECTOR PLAN 1
on Xarelto.  Pt instructed to hold for 4 days prior to surgery and to confirm instructions with Dr Ellison (cardiologist).  copy of recent stress test report in chart  Pending copy of cardiology eval report with instructions on Xarelto

## 2022-09-26 NOTE — H&P PST ADULT - OTHER CARE PROVIDERS
Dr Ellison (cardiologist) [phone] Dr Ellison (cardiologist) [phone] [facility]

## 2022-09-26 NOTE — H&P PST ADULT - HISTORY OF PRESENT ILLNESS
52y/o female presents for preop eval for scheduled right excisional biopsy with magseed localization X 1 site.  Pt states had routine mammogram earlier this month.  Mammogram showed abnormal right breast finding.  Biopsy done.  Preop dx unspecified benign mammary dysplasia unspecified breast.

## 2022-09-26 NOTE — H&P PST ADULT - NSANTHOSAYNRD_GEN_A_CORE
No. NEAL screening performed.  STOP BANG Legend: 0-2 = LOW Risk; 3-4 = INTERMEDIATE Risk; 5-8 = HIGH Risk

## 2022-09-26 NOTE — H&P PST ADULT - PROBLEM SELECTOR PLAN 4
Scheduled for right excisional biopsy with magseed localization X1 site on 10/10/22  Written & verbal preop instructions, gi prophylaxis & surgical soap given  Pt verbalized good understanding.  Teach back done on surgical soap instructions. Scheduled for right excisional biopsy with magseed localization X1 site on 10/10/22  Written & verbal preop instructions, gi prophylaxis & surgical soap given  Pt verbalized understanding   NEAL precautions recommended.

## 2022-10-04 ENCOUNTER — APPOINTMENT (OUTPATIENT)
Dept: MAMMOGRAPHY | Facility: IMAGING CENTER | Age: 52
End: 2022-10-04

## 2022-10-04 ENCOUNTER — OUTPATIENT (OUTPATIENT)
Dept: OUTPATIENT SERVICES | Facility: HOSPITAL | Age: 52
LOS: 1 days | End: 2022-10-04
Payer: MEDICAID

## 2022-10-04 ENCOUNTER — RESULT REVIEW (OUTPATIENT)
Age: 52
End: 2022-10-04

## 2022-10-04 DIAGNOSIS — Z90.710 ACQUIRED ABSENCE OF BOTH CERVIX AND UTERUS: Chronic | ICD-10-CM

## 2022-10-04 DIAGNOSIS — Z00.8 ENCOUNTER FOR OTHER GENERAL EXAMINATION: ICD-10-CM

## 2022-10-04 PROCEDURE — 19281 PERQ DEVICE BREAST 1ST IMAG: CPT | Mod: RT

## 2022-10-04 PROCEDURE — 19281 PERQ DEVICE BREAST 1ST IMAG: CPT

## 2022-10-04 PROCEDURE — C1739: CPT

## 2022-10-07 LAB — SARS-COV-2 RNA SPEC QL NAA+PROBE: SIGNIFICANT CHANGE UP

## 2022-10-10 ENCOUNTER — APPOINTMENT (OUTPATIENT)
Dept: SURGICAL ONCOLOGY | Facility: AMBULATORY SURGERY CENTER | Age: 52
End: 2022-10-10

## 2022-10-10 ENCOUNTER — APPOINTMENT (OUTPATIENT)
Dept: MAMMOGRAPHY | Facility: IMAGING CENTER | Age: 52
End: 2022-10-10

## 2022-10-20 ENCOUNTER — TRANSCRIPTION ENCOUNTER (OUTPATIENT)
Age: 52
End: 2022-10-20

## 2022-10-20 VITALS
WEIGHT: 194.01 LBS | TEMPERATURE: 98 F | DIASTOLIC BLOOD PRESSURE: 96 MMHG | RESPIRATION RATE: 18 BRPM | HEIGHT: 66 IN | SYSTOLIC BLOOD PRESSURE: 148 MMHG | OXYGEN SATURATION: 96 % | HEART RATE: 71 BPM

## 2022-10-20 NOTE — ASU PREOPERATIVE ASSESSMENT, ADULT (IPARK ONLY) - FALL HARM RISK - UNIVERSAL INTERVENTIONS
Bed in lowest position, wheels locked, appropriate side rails in place/Call bell, personal items and telephone in reach/Instruct patient to call for assistance before getting out of bed or chair/Non-slip footwear when patient is out of bed/[place] to call system/Physically safe environment - no spills, clutter or unnecessary equipment/Purposeful Proactive Rounding/Room/bathroom lighting operational, light cord in reach

## 2022-10-21 ENCOUNTER — RESULT REVIEW (OUTPATIENT)
Age: 52
End: 2022-10-21

## 2022-10-21 ENCOUNTER — APPOINTMENT (OUTPATIENT)
Dept: SURGICAL ONCOLOGY | Facility: AMBULATORY SURGERY CENTER | Age: 52
End: 2022-10-21

## 2022-10-21 ENCOUNTER — OUTPATIENT (OUTPATIENT)
Dept: OUTPATIENT SERVICES | Facility: HOSPITAL | Age: 52
LOS: 1 days | End: 2022-10-21
Payer: COMMERCIAL

## 2022-10-21 ENCOUNTER — TRANSCRIPTION ENCOUNTER (OUTPATIENT)
Age: 52
End: 2022-10-21

## 2022-10-21 ENCOUNTER — OUTPATIENT (OUTPATIENT)
Dept: OUTPATIENT SERVICES | Facility: HOSPITAL | Age: 52
LOS: 1 days | Discharge: HOME | End: 2022-10-21

## 2022-10-21 ENCOUNTER — APPOINTMENT (OUTPATIENT)
Dept: MAMMOGRAPHY | Facility: IMAGING CENTER | Age: 52
End: 2022-10-21

## 2022-10-21 VITALS
OXYGEN SATURATION: 97 % | HEART RATE: 72 BPM | SYSTOLIC BLOOD PRESSURE: 121 MMHG | RESPIRATION RATE: 15 BRPM | DIASTOLIC BLOOD PRESSURE: 85 MMHG

## 2022-10-21 DIAGNOSIS — Z00.8 ENCOUNTER FOR OTHER GENERAL EXAMINATION: ICD-10-CM

## 2022-10-21 DIAGNOSIS — Z90.710 ACQUIRED ABSENCE OF BOTH CERVIX AND UTERUS: Chronic | ICD-10-CM

## 2022-10-21 DIAGNOSIS — N60.99 UNSPECIFIED BENIGN MAMMARY DYSPLASIA OF UNSPECIFIED BREAST: ICD-10-CM

## 2022-10-21 PROCEDURE — 76098 X-RAY EXAM SURGICAL SPECIMEN: CPT | Mod: 26

## 2022-10-21 PROCEDURE — 88307 TISSUE EXAM BY PATHOLOGIST: CPT | Mod: 26

## 2022-10-21 PROCEDURE — 76098 X-RAY EXAM SURGICAL SPECIMEN: CPT

## 2022-10-21 PROCEDURE — 19125 EXCISION BREAST LESION: CPT

## 2022-10-21 RX ORDER — LOSARTAN POTASSIUM 100 MG/1
1 TABLET, FILM COATED ORAL
Qty: 0 | Refills: 0 | DISCHARGE

## 2022-10-21 RX ORDER — AZELASTINE 137 UG/1
0 SPRAY, METERED NASAL
Qty: 0 | Refills: 0 | DISCHARGE

## 2022-10-21 RX ORDER — METHOTREXATE 2.5 MG/1
0 TABLET ORAL
Qty: 0 | Refills: 0 | DISCHARGE

## 2022-10-21 RX ORDER — METOPROLOL TARTRATE 50 MG
1 TABLET ORAL
Qty: 0 | Refills: 0 | DISCHARGE

## 2022-10-21 RX ORDER — ATORVASTATIN CALCIUM 80 MG/1
1 TABLET, FILM COATED ORAL
Qty: 0 | Refills: 0 | DISCHARGE

## 2022-10-21 RX ORDER — RIVAROXABAN 15 MG-20MG
1 KIT ORAL
Qty: 0 | Refills: 0 | DISCHARGE

## 2022-10-21 RX ORDER — MONTELUKAST 4 MG/1
1 TABLET, CHEWABLE ORAL
Qty: 0 | Refills: 0 | DISCHARGE

## 2022-10-21 NOTE — ASU DISCHARGE PLAN (ADULT/PEDIATRIC) - ASU DC SPECIAL INSTRUCTIONSFT
1) Take Tylenol for pain (975 mg or 1 gram every 6 hours). Apply ice packs for pain relief as well  2) Advance diet as tolerated.  3) May take outer bandage off after 24 hours. Leave steri-strips underneath in place. May shower. Allow soapy water to run over, do not scrub. Pat dry.  4) Avoid vigorous exercise and heavy lifting (>10 lbs) until at least follow-up appointment.  5) Call office to schedule a follow-up appointment. 1) Take Tylenol for pain (975 mg or 1 gram every 6 hours). Apply ice packs for pain relief as well  2) Advance diet as tolerated.  3) May take outer bandage off after 48 hours. Leave steri-strips underneath in place. May shower. Allow soapy water to run over, do not scrub. Pat dry.  4) Avoid vigorous exercise and heavy lifting (>10 lbs) until at least follow-up appointment.  5) Call office to schedule a follow-up appointment.

## 2022-10-21 NOTE — ASU DISCHARGE PLAN (ADULT/PEDIATRIC) - NS MD DC FALL RISK RISK
For information on Fall & Injury Prevention, visit: [email or web address] OR  [email or web address] OR  https://www.cdc.gov/steadi/patient.html

## 2022-10-21 NOTE — ASU DISCHARGE PLAN (ADULT/PEDIATRIC) - CARE PROVIDER_API CALL
Mey Reyes)  Surgery  [address] [place] [address]  Phone: [phone]  Fax: [phone]  Follow Up Time: 2 weeks

## 2022-10-21 NOTE — BRIEF OPERATIVE NOTE - NSICDXBRIEFPREOP_GEN_ALL_CORE_FT
PRE-OP DIAGNOSIS:  Atypical ductal hyperplasia of right breast 21-Oct-2022 08:48:48  Brittany Camarillo

## 2022-10-21 NOTE — BRIEF OPERATIVE NOTE - NSICDXBRIEFPOSTOP_GEN_ALL_CORE_FT
POST-OP DIAGNOSIS:  Atypical ductal hyperplasia of right breast 21-Oct-2022 08:49:04  Brittany Camarillo

## 2022-10-21 NOTE — BRIEF OPERATIVE NOTE - OPERATION/FINDINGS
Right breast lumpectomy with Magseed localization. Confirmation of Magseed and clip within the specimen was confirmed with x-ray. Hemostasis was achieved and the incision was closed without complication.

## 2022-10-25 LAB — SURGICAL PATHOLOGY STUDY: SIGNIFICANT CHANGE UP

## 2022-10-27 PROBLEM — R92.2 DENSE BREAST: Status: ACTIVE | Noted: 2022-10-27

## 2022-11-01 NOTE — ED ADULT TRIAGE NOTE - BP NONINVASIVE DIASTOLIC (MM HG)
70 [de-identified] : right knee: NVI 0-120, ligaments stable, \par \par Left Knee: Lateral joint line tenderness. Knee Range of Motion is as follows: Extension: 0 degrees. Flexion: 125 degrees. Gait and function is as follows: patient ambulates without assistive device.

## 2022-11-02 ENCOUNTER — APPOINTMENT (OUTPATIENT)
Dept: SURGICAL ONCOLOGY | Facility: CLINIC | Age: 52
End: 2022-11-02

## 2022-11-02 ENCOUNTER — NON-APPOINTMENT (OUTPATIENT)
Age: 52
End: 2022-11-02

## 2022-11-02 VITALS
OXYGEN SATURATION: 97 % | WEIGHT: 197 LBS | BODY MASS INDEX: 29.86 KG/M2 | SYSTOLIC BLOOD PRESSURE: 125 MMHG | HEART RATE: 80 BPM | HEIGHT: 68 IN | DIASTOLIC BLOOD PRESSURE: 90 MMHG | RESPIRATION RATE: 16 BRPM

## 2022-11-02 DIAGNOSIS — R92.2 INCONCLUSIVE MAMMOGRAM: ICD-10-CM

## 2022-11-02 PROCEDURE — 99024 POSTOP FOLLOW-UP VISIT: CPT

## 2022-11-02 NOTE — PHYSICAL EXAM
[Normocephalic] : normocephalic [Atraumatic] : atraumatic [EOMI] : extra ocular movement intact [PERRL] : pupils equal, round and reactive to light [Sclera nonicteric] : sclera nonicteric [Symmetrical] : symmetrical [Bra Size: ___] : Bra Size: [unfilled] [Grade 2] : Ptosis Grade 2 [Breast Mass Right Breast ___cm] : no masses [Breast Mass Left Breast ___cm] : no masses [Breast Nipple Inversion] : nipples not inverted [Breast Nipple Retraction] : nipples not retracted [Breast Nipple Flattening] : nipples not flattened [Breast Nipple Fissures] : nipples not fissured [Breast Abnormal Lactation (Galactorrhea)] : no galactorrhea [Breast Abnormal Secretion Bloody Fluid] : no bloody discharge [Breast Abnormal Secretion Serous Fluid] : no serous discharge [Breast Abnormal Secretion Opalescent Fluid] : no milky discharge [No Edema] : no edema [No Rashes] : no rashes [No Ulceration] : no ulceration [de-identified] : non-labored respirations  [de-identified] : Very dense breasts [de-identified] : inferior circumareolar incision healing well, c/d/i, no seroma, no erythema.

## 2022-11-02 NOTE — HISTORY OF PRESENT ILLNESS
[FreeTextEntry1] : The patient is a 52 year F referred by Thalia JIM for consultation regarding ADH s/p R excisional bx on 10/21/2022\par \par She is accompanied by her 2 yr old granddaughter, Bandar.\par \par Prior History:\par The patient reports that she has not had regular breast screening. Her last mammogram was in 2016, reportedly normal. She was encouraged to go for screening this year.\par \par Most recent  imaging:\par 8/12/2022 B/L SM (NW) TC 24.2% revealed heterogeneously dense breasts \par - L negative\par - R central to lateral N7: 5 mm cluster of calcs -> R DM\par - BR0, given the patient's history, she may meet American Cancer Society guidelines for annual screening with breast MRI in addition to annual mmg\par \par 08/18/2022 R DM (NW)\par - R small group of punctate calcs in a linear distribution -> R Stereo\par - BR4A\par \par 8/23/2022 R Stereo (NW)\par - R lower central (tophat): ADH, secretory change, calcs present in secretions and benign tissue.\par - Concordant\par \par She reports history of HTN, Afib, arthritis.\par She previously had a hysterectomy in 2007 for a "cyst."\par Her medications include Xarelto and metoprolol. She takes Plaquenil and methotrexate as needed.\par \par Patient has a family history of breast cancer: mother, age 45.\par \par 9/16/2022 B/L SM w/ IV contrast (NW)\par - Low KV negar imaging negative\par - R lower central bx clip corresponding to stereo bx\par - High KV subtraction images demonstrate mild background parenchymal enhancement, no suspicious enhancing mass\par \par 10/21/2022 R excisional bx\par - R focal minimal ADH, minute IDP, secretory change, benign epithelial w/ calcs, bx changes.\par \par Interval History (postop):\par The patient reports some pain, she takes Tylenol still as needed. Pain is well-controlled. Denies fevers/chills.

## 2022-11-02 NOTE — CONSULT LETTER
[Dear  ___] : Dear ~LAQUITA, [Courtesy Letter:] : I had the pleasure of seeing your patient, [unfilled], in my office today. [Please see my note below.] : Please see my note below. [Consult Closing:] : Thank you very much for allowing me to participate in the care of this patient.  If you have any questions, please do not hesitate to contact me. [Sincerely,] : Sincerely, [FreeTextEntry3] : Mey Reyes MD\par Breast Surgeon\par Division of Surgical Oncology\par Department of Surgery\par [address] [place] [address]\[place] [address] \par Tel: [phone]\par Fax: [phone]\par Email: [email or web address]

## 2022-11-16 ENCOUNTER — OUTPATIENT (OUTPATIENT)
Dept: OUTPATIENT SERVICES | Facility: HOSPITAL | Age: 52
LOS: 1 days | Discharge: HOME | End: 2022-11-16

## 2022-11-16 DIAGNOSIS — C50.919 MALIGNANT NEOPLASM OF UNSPECIFIED SITE OF UNSPECIFIED FEMALE BREAST: ICD-10-CM

## 2022-11-16 DIAGNOSIS — Z90.710 ACQUIRED ABSENCE OF BOTH CERVIX AND UTERUS: Chronic | ICD-10-CM

## 2022-11-21 ENCOUNTER — APPOINTMENT (OUTPATIENT)
Dept: HEMATOLOGY ONCOLOGY | Facility: CLINIC | Age: 52
End: 2022-11-21
Payer: MEDICAID

## 2022-11-21 VITALS
BODY MASS INDEX: 30.57 KG/M2 | HEART RATE: 60 BPM | SYSTOLIC BLOOD PRESSURE: 144 MMHG | WEIGHT: 201.72 LBS | TEMPERATURE: 97.7 F | OXYGEN SATURATION: 97 % | RESPIRATION RATE: 16 BRPM | HEIGHT: 67.99 IN | DIASTOLIC BLOOD PRESSURE: 92 MMHG

## 2022-11-21 DIAGNOSIS — N60.99 UNSPECIFIED BENIGN MAMMARY DYSPLASIA OF UNSPECIFIED BREAST: ICD-10-CM

## 2022-11-21 PROCEDURE — 99204 OFFICE O/P NEW MOD 45 MIN: CPT | Mod: 25

## 2022-11-21 PROCEDURE — 99072 ADDL SUPL MATRL&STAF TM PHE: CPT

## 2022-11-21 RX ORDER — TAMOXIFEN CITRATE 20 MG/1
20 TABLET, FILM COATED ORAL DAILY
Qty: 30 | Refills: 2 | Status: ACTIVE | COMMUNITY
Start: 2022-11-21 | End: 1900-01-01

## 2022-11-21 NOTE — ASSESSMENT
[FreeTextEntry1] : She is a 53 y/o F with R breast atypical ductal hyperplasia. Reviewed the risk of future breast cancer and role of continued breast surveillance with imaging and exam. Reviewed low fat diet. We reviewed chemoprevention and the pros and cons of chemoprevention. We reviewed tamoxifen for chemoprevention for up to 5 years. We reviewed the potential side effects of endocrine therapy including but not limited to: hot flash, GI upset, bone stiffness/ arthralgias, fatigue, and weight changes. We reviewed supportive measures to decrease these side effects. We reviewed bone health with calcium and Vitamin D supplementation. We reviewed the less than 1% risk of blood thickness on tamoxifen, she has hysterectomy and will not have issue with uterine lining thickening. Written information on tamoxifen was provided to her. Questions were answered to her satisfaction. She understands that if she has intolerable SE, she can stop chemoprevention. She consents to tamoxifen trial. Next follow up in January.

## 2022-11-21 NOTE — REVIEW OF SYSTEMS
[Joint Pain] : joint pain [Joint Stiffness] : joint stiffness [Muscle Pain] : no muscle pain [Muscle Weakness] : no muscle weakness

## 2022-11-21 NOTE — CONSULT LETTER
[Dear  ___] : Dear  [unfilled], [Consult Letter:] : I had the pleasure of evaluating your patient, [unfilled]. [( Thank you for referring [unfilled] for consultation for _____ )] : Thank you for referring [unfilled] for consultation for [unfilled] [Please see my note below.] : Please see my note below. [Consult Closing:] : Thank you very much for allowing me to participate in the care of this patient.  If you have any questions, please do not hesitate to contact me. [Sincerely,] : Sincerely, [FreeTextEntry2] : Mey Reyes MD\par [address] [place] [address]\[place] [address] [FreeTextEntry3] : Farooq Ferreira MD\par Attending\par [facility]\par

## 2022-11-21 NOTE — PACU DISCHARGE NOTE - MENTAL STATUS: PATIENT PARTICIPATION
A collaboration between [facility] and [facility]  Experts in minimally invasive, targeted treatments performed using imaging guidance    Venous Access Device,  Port Catheter or Tunneled or Non-Tunneled Central Line Placement    Today you had a procedure today to install a venous access device; either a tunneled central vein catheter or a subcutaneous port catheter.    After you go home:  Drink plenty of fluids.  Generally 6-8 (8 ounce) glasses a day is recommended.  Resume your regular diet unless otherwise ordered by a medical provider.  Keep any applied tape/gauze dressings clean and dry.  Change tape/gauze dressings if they get wet or soiled.  You may shower the following day after procedure, however cover and protect from moisture any tape/gauze dressings.  You may let water hit and run over dried skin glue, but do not scrub.  Pat the area dry after showering.  Port placement incisions are closed with absorbable suture, meaning they do not need to be removed at a later date, and a topical skin adhesive (skin glue).  This glue will wear off in 7-14 days.  Do not remove before this time.  If 14 days have passed and residual glue is present, you may gently remove it.  Do not apply gels, lotions, or ointments to the glue site for the first 10 days as this may cause the glue to prematurely soften and fail.  Do not perform strenuous activities or lift greater than 10 pounds for the next three days.  If there is bleeding or oozing from the procedure site, apply firm pressure to the area for 5-10 minutes.  If the bleeding continues seek medical advice at the numbers below.  Mild procedure site discomfort can be treated with an ice pack and over-the-counter pain relievers.        For 24 hours after any sedation used:  Relax and take it easy.  No strenuous activities.  Do not drive or operate machines at home or at work.  No alcohol consumption.  Do not make any  important or legal decisions.    Call our Interventional Radiology (IR) service if:  If you start bleeding from the procedure site.  If you do start to bleed from the site, lie down and hold some pressure on the site.  Our radiology provider can help you decide if you need to return to the hospital.  If you have new or worsening pain related to the procedure.  If you have concerning swelling at the procedure site.  If you develop persistent nausea or vomiting.  If you develop hives or a rash or any unexplained itching.  If you have a fever of greater than 100.5  F and chills in the first 5 days after procedure.  Any other concerns related to your procedure.      [facility]  Interventional Radiology (IR)  [address]  [address] [facility] [place] [address]    Contact Number:  [phone]  (IR control desk)  Monday - Friday 8:00 am - 4:30 pm    After hours for urgent concerns:  [phone]  After 4:30 pm Monday - Friday, Weekends and Holidays.   Ask for Interventional Radiology on-call.  Someone is available 24 hours a day.  [facility] toll free number:  [phone]                      Awake

## 2022-11-21 NOTE — HISTORY OF PRESENT ILLNESS
[de-identified] : Age 52: Right atypical ductal hyperplasia\par Screen detected: she had screening mammogram done 8/18/2022 which showed indeterminate calcifications in the lower central R breast. She had R lower central stereotactic core biopsy on 8/23/2022 which showed focal minimal atypical ductal hyperplasia. She underwent excisional biopsy of the R breast on 10/21/2022 with Dr Reyes. The pathology showed focal minimal atypical ductal hyperplasia and minute intraductal papilloma.  [de-identified] : ADH [de-identified] : She is present to review chemoprevention. She had FH of mother with breast cancer at age 43. She has no other FH of any cancer. 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 Had hysterectomy and tolerating menopause well. No personal history of blood clot or stroke.

## 2022-11-21 NOTE — REASON FOR VISIT
[[employer] ] : provided by [employer]   [FreeTextEntry2] : Evaluation for chemoprevention  [Interpreters_IDNumber] : [id number] [TWNoteComboBox1] : Prashant

## 2022-11-28 ENCOUNTER — APPOINTMENT (OUTPATIENT)
Dept: OTOLARYNGOLOGY | Facility: CLINIC | Age: 52
End: 2022-11-28

## 2022-11-28 VITALS
SYSTOLIC BLOOD PRESSURE: 122 MMHG | HEIGHT: 68 IN | HEART RATE: 79 BPM | BODY MASS INDEX: 30.46 KG/M2 | WEIGHT: 201 LBS | DIASTOLIC BLOOD PRESSURE: 84 MMHG

## 2022-11-28 PROCEDURE — 99214 OFFICE O/P EST MOD 30 MIN: CPT

## 2022-11-28 RX ORDER — FLUTICASONE PROPIONATE 50 UG/1
50 SPRAY, METERED NASAL
Qty: 1 | Refills: 3 | Status: ACTIVE | COMMUNITY
Start: 2022-11-28 | End: 1900-01-01

## 2022-11-28 RX ORDER — MONTELUKAST 10 MG/1
10 TABLET, FILM COATED ORAL
Qty: 30 | Refills: 2 | Status: ACTIVE | COMMUNITY
Start: 2022-11-28 | End: 1900-01-01

## 2022-11-28 NOTE — PHYSICAL EXAM
[de-identified] : mild rhinitis [Normal] : mucosa is normal [Midline] : trachea located in midline position

## 2022-11-28 NOTE — REASON FOR VISIT
[Subsequent Evaluation] : a subsequent evaluation for [FreeTextEntry2] : chronic sinusitis and rhinitis

## 2022-11-28 NOTE — HISTORY OF PRESENT ILLNESS
[de-identified] : 52 year old female presents for follow-up for chronic sinusitis and rhinitis \par Reports use of Azelastine, Flonase and Zrytec PRN without relief. \par States nasal congestion mainly at night, sinus pain/pressure, post nasal drip without phlegm production, clear nasal discharge and mild headaches\par Report throat feels very dry especially in the morning because she is a mouth breather when she sleeps. \par Patient denies dysphagia, odynophagia, dyspnea, dysphonia, otalgia, recent fevers or infections.\par \par CT Sinuses done 08/12/2022 IMPRESSION:   No evidence of acute inflammatory disease of the paranasal sinuses. Nasal septal deviation to the left. Narrowing of the left OMU which may predispose to sinus outflow obstruction.

## 2022-11-28 NOTE — ASSESSMENT
[FreeTextEntry1] : CT PREVIOUSLY DONE REVIEWED\par SYMPTOMS MILD RHINITIS/ ALLERGY EXPLAINED/ \par HORMONE CHANGES EXPLAINED/ POTENTIAL AGGRAVATING FACTOR\par FLONASE\par SINGULAIRE\par EUCALYPTUS HUMIDIFIER\par F/U 6 MONTHS PRN

## 2023-01-19 ENCOUNTER — OUTPATIENT (OUTPATIENT)
Dept: OUTPATIENT SERVICES | Facility: HOSPITAL | Age: 53
LOS: 1 days | Discharge: HOME | End: 2023-01-19

## 2023-01-19 DIAGNOSIS — C50.919 MALIGNANT NEOPLASM OF UNSPECIFIED SITE OF UNSPECIFIED FEMALE BREAST: ICD-10-CM

## 2023-01-19 DIAGNOSIS — Z90.710 ACQUIRED ABSENCE OF BOTH CERVIX AND UTERUS: Chronic | ICD-10-CM

## 2023-01-25 ENCOUNTER — APPOINTMENT (OUTPATIENT)
Dept: HEMATOLOGY ONCOLOGY | Facility: CLINIC | Age: 53
End: 2023-01-25

## 2023-01-30 ENCOUNTER — APPOINTMENT (OUTPATIENT)
Dept: OTOLARYNGOLOGY | Facility: CLINIC | Age: 53
End: 2023-01-30
Payer: MEDICAID

## 2023-01-30 VITALS
DIASTOLIC BLOOD PRESSURE: 82 MMHG | SYSTOLIC BLOOD PRESSURE: 126 MMHG | HEIGHT: 66 IN | BODY MASS INDEX: 31.66 KG/M2 | HEART RATE: 80 BPM | WEIGHT: 197 LBS

## 2023-01-30 DIAGNOSIS — J34.2 DEVIATED NASAL SEPTUM: ICD-10-CM

## 2023-01-30 DIAGNOSIS — J31.0 CHRONIC RHINITIS: ICD-10-CM

## 2023-01-30 PROCEDURE — 99214 OFFICE O/P EST MOD 30 MIN: CPT

## 2023-01-30 RX ORDER — CETIRIZINE HYDROCHLORIDE 10 MG/1
10 TABLET, COATED ORAL
Qty: 30 | Refills: 1 | Status: ACTIVE | COMMUNITY
Start: 2023-01-30 | End: 1900-01-01

## 2023-01-30 RX ORDER — SODIUM CHLORIDE 0.65 %
0.65 AEROSOL, SPRAY (ML) NASAL TWICE DAILY
Qty: 1 | Refills: 3 | Status: ACTIVE | COMMUNITY
Start: 2023-01-30 | End: 1900-01-01

## 2023-01-30 RX ORDER — FLUTICASONE PROPIONATE 50 UG/1
50 SPRAY, METERED NASAL DAILY
Qty: 1 | Refills: 2 | Status: ACTIVE | COMMUNITY
Start: 2023-01-30 | End: 1900-01-01

## 2023-01-30 NOTE — REASON FOR VISIT
[Initial Evaluation] : an initial evaluation for [Other: ______] : provided by KELVIN [FreeTextEntry2] : chronic sinusitis. [Interpreters_IDNumber] : [id number] [Interpreters_FullName] : Yuliana [TWNoteComboBox1] : Prashant

## 2023-01-30 NOTE — HISTORY OF PRESENT ILLNESS
[de-identified] : 52 year old female presents for follow-up of chronic sinusitis.\par Reports current throat pain, odynophagia, frequent throat clearing, post nasal drip and right-sided head pain from nasal congestion.\par Reports whenever she eats solids/liquids she feels like the it gets stuck in the back of throat and it takes time to clear/go down her throat.\par Denies dyspnea, dysphonia, otalgia, recent fevers.\par Reports use of Flonase, Azelastine and Zyrtec daily.\par \par CT Sinuses No Cont 08/12/2022 IMPRESSION:   No evidence of acute inflammatory disease of the paranasal sinuses. Nasal septal deviation to the left. Narrowing of the left OMU which may predispose to sinus outflow obstruction.\par

## 2023-01-30 NOTE — ASSESSMENT
[FreeTextEntry1] : PATIENT REFUSED NASOPHARYNGOSCOPY\par NASAL SALINE SPRAY\par EUCALYPTUS HUMIDIFIER\par FLONASE\par PMD EXAM AND UP TO DATE PHYSICAL\par GI CONSULT\par ZYRTEC\par F/U AFTER ABOVE

## 2023-01-30 NOTE — PHYSICAL EXAM
[] : septum deviated bilaterally [de-identified] : MILD RHINITS [Normal] : mucosa is normal [Midline] : trachea located in midline position

## 2023-01-31 DIAGNOSIS — J34.2 DEVIATED NASAL SEPTUM: ICD-10-CM

## 2023-02-06 ENCOUNTER — NON-APPOINTMENT (OUTPATIENT)
Age: 53
End: 2023-02-06

## 2023-02-23 ENCOUNTER — APPOINTMENT (OUTPATIENT)
Dept: CARDIOLOGY | Facility: HOSPITAL | Age: 53
End: 2023-02-23

## 2023-03-14 DIAGNOSIS — E78.5 HYPERLIPIDEMIA, UNSPECIFIED: ICD-10-CM

## 2023-03-14 RX ORDER — METOPROLOL TARTRATE 50 MG/1
50 TABLET, FILM COATED ORAL TWICE DAILY
Qty: 180 | Refills: 3 | Status: ACTIVE | COMMUNITY
Start: 2021-11-04 | End: 1900-01-01

## 2023-03-14 RX ORDER — ATORVASTATIN CALCIUM 10 MG/1
10 TABLET, FILM COATED ORAL
Qty: 90 | Refills: 0 | Status: ACTIVE | COMMUNITY
Start: 2023-03-14 | End: 1900-01-01
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
## 2024-01-31 NOTE — ASU PREOP CHECKLIST - HEIGHT IN CM
If you are a smoker, it is important for your health to stop smoking. Please be aware that second hand smoke is also harmful.
167.64

## 2024-08-31 ENCOUNTER — NON-APPOINTMENT (OUTPATIENT)
Age: 54
End: 2024-08-31

## 2025-01-09 NOTE — ED ADULT NURSE NOTE - CAS TRG GENERAL AIRWAY, MLM
Escitalopram 5 mg po daily sent to the [facility] in [place].  Avoid NSAIDs due to increase risk of bleeding with escitalopram.  Follow up with me in 1 month   Patent

## 2025-04-21 ENCOUNTER — APPOINTMENT (OUTPATIENT)
Dept: OTOLARYNGOLOGY | Facility: CLINIC | Age: 55
End: 2025-04-21
Payer: MEDICAID

## 2025-04-21 VITALS
HEART RATE: 90 BPM | DIASTOLIC BLOOD PRESSURE: 93 MMHG | SYSTOLIC BLOOD PRESSURE: 134 MMHG | HEIGHT: 66 IN | BODY MASS INDEX: 31.66 KG/M2 | WEIGHT: 197 LBS

## 2025-04-21 DIAGNOSIS — J31.0 CHRONIC RHINITIS: ICD-10-CM

## 2025-04-21 PROCEDURE — 99213 OFFICE O/P EST LOW 20 MIN: CPT

## 2025-04-21 RX ORDER — AZELASTINE HYDROCHLORIDE 137 UG/1
0.1 SPRAY, METERED NASAL
Qty: 1 | Refills: 2 | Status: ACTIVE | COMMUNITY
Start: 2025-04-21 | End: 1900-01-01

## 2025-04-21 RX ORDER — FLUTICASONE PROPIONATE 50 UG/1
50 SPRAY, METERED NASAL
Qty: 1 | Refills: 0 | Status: ACTIVE | COMMUNITY
Start: 2025-04-21 | End: 1900-01-01

## 2025-05-14 ENCOUNTER — NON-APPOINTMENT (OUTPATIENT)
Age: 55
End: 2025-05-14

## 2025-08-29 ENCOUNTER — RX RENEWAL (OUTPATIENT)
Age: 55
End: 2025-08-29

## (undated) DEVICE — RADIOGRAPHY DVC SPEC TRANSPEC

## (undated) DEVICE — GLV 7 PROTEXIS (WHITE)

## (undated) DEVICE — SUT SILK 2-0 30" SH

## (undated) DEVICE — ELCTR BOVIE TIP BLADE INSULATED 2.75" EDGE

## (undated) DEVICE — ELCTR GROUNDING PAD ADULT COVIDIEN

## (undated) DEVICE — SUT MONOCRYL 4-0 27" PS-2 UNDYED

## (undated) DEVICE — POSITIONER STRAP ARMBOARD VELCRO TS-30

## (undated) DEVICE — WARMING BLANKET LOWER ADULT

## (undated) DEVICE — GOWN LG

## (undated) DEVICE — SOL IRR POUR NS 0.9% 500ML

## (undated) DEVICE — DRSG STERISTRIPS 0.5 X 4"

## (undated) DEVICE — GLV 7.5 PROTEXIS (WHITE)

## (undated) DEVICE — VENODYNE/SCD SLEEVE CALF MEDIUM

## (undated) DEVICE — SUT VICRYL 3-0 27" SH UNDYED

## (undated) DEVICE — DRAPE CHEST BREAST 106" X 122"